# Patient Record
Sex: MALE | Race: WHITE | NOT HISPANIC OR LATINO | Employment: OTHER | URBAN - METROPOLITAN AREA
[De-identification: names, ages, dates, MRNs, and addresses within clinical notes are randomized per-mention and may not be internally consistent; named-entity substitution may affect disease eponyms.]

---

## 2020-05-22 ENCOUNTER — LAB VISIT (OUTPATIENT)
Dept: PRIMARY CARE CLINIC | Facility: CLINIC | Age: 65
End: 2020-05-22
Payer: MEDICARE

## 2020-05-22 DIAGNOSIS — R05.9 COUGH: Primary | ICD-10-CM

## 2020-05-22 PROCEDURE — U0003 INFECTIOUS AGENT DETECTION BY NUCLEIC ACID (DNA OR RNA); SEVERE ACUTE RESPIRATORY SYNDROME CORONAVIRUS 2 (SARS-COV-2) (CORONAVIRUS DISEASE [COVID-19]), AMPLIFIED PROBE TECHNIQUE, MAKING USE OF HIGH THROUGHPUT TECHNOLOGIES AS DESCRIBED BY CMS-2020-01-R: HCPCS

## 2020-05-23 LAB — SARS-COV-2 RNA RESP QL NAA+PROBE: NOT DETECTED

## 2020-08-03 ENCOUNTER — OFFICE VISIT (OUTPATIENT)
Dept: FAMILY MEDICINE | Facility: CLINIC | Age: 65
End: 2020-08-03
Payer: MEDICARE

## 2020-08-03 VITALS
WEIGHT: 158 LBS | HEART RATE: 64 BPM | HEIGHT: 68 IN | SYSTOLIC BLOOD PRESSURE: 136 MMHG | BODY MASS INDEX: 23.95 KG/M2 | TEMPERATURE: 99 F | DIASTOLIC BLOOD PRESSURE: 72 MMHG

## 2020-08-03 DIAGNOSIS — Z12.11 COLON CANCER SCREENING: ICD-10-CM

## 2020-08-03 DIAGNOSIS — Z12.5 PROSTATE CANCER SCREENING: ICD-10-CM

## 2020-08-03 DIAGNOSIS — I10 ESSENTIAL HYPERTENSION: Primary | ICD-10-CM

## 2020-08-03 PROCEDURE — 99203 OFFICE O/P NEW LOW 30 MIN: CPT | Mod: S$GLB,,, | Performed by: NURSE PRACTITIONER

## 2020-08-03 PROCEDURE — 99203 PR OFFICE/OUTPT VISIT, NEW, LEVL III, 30-44 MIN: ICD-10-PCS | Mod: S$GLB,,, | Performed by: NURSE PRACTITIONER

## 2020-08-03 RX ORDER — LOSARTAN POTASSIUM 100 MG/1
1 TABLET ORAL DAILY
COMMUNITY
Start: 2014-06-01 | End: 2020-08-03 | Stop reason: SDUPTHER

## 2020-08-03 RX ORDER — LOSARTAN POTASSIUM 100 MG/1
100 TABLET ORAL DAILY
Qty: 90 TABLET | Refills: 3 | Status: SHIPPED | OUTPATIENT
Start: 2020-08-03 | End: 2021-08-04 | Stop reason: SDUPTHER

## 2020-08-03 RX ORDER — MULTIVITAMIN/IRON/FOLIC ACID 18MG-0.4MG
TABLET ORAL
COMMUNITY
Start: 2010-08-01

## 2020-08-03 RX ORDER — GLUCOSAMINE/D3/BOSWELLIA SERRA 1500MG-400
TABLET ORAL
COMMUNITY

## 2020-08-03 RX ORDER — TURMERIC 400 MG
CAPSULE ORAL
COMMUNITY
Start: 2020-07-01

## 2020-08-03 NOTE — PROGRESS NOTES
SUBJECTIVE:    Patient ID: Myron Iqbal is a 65 y.o. male.    Chief Complaint: Establish Care (brought bottles// SW)      Pt here for new pt appt. Former pt of Maxime Orr  Pt reports hx of HTN for past 5-6 years, controlled on losartan. Reports past year or two has had a couple episodes of vertigo- seen in UC and treated with meclizine which resolves symptoms. No recent symptoms  Stays active, walks for exercise 3 days/week. Reports has a house in [place] with his partner- his partner is currently trying to become a  and needs form completed. Mother is in a NH locally so states he won't move until she passes away.  Reports last cscope 5 years ago with Dr. Zhong [facility]- told to repeat in 10 years however he reports his father  age 64 with colon CA       Lab Visit on 2020   Component Date Value Ref Range Status    SARS-CoV2 (COVID-19) Qualitative P* 2020 Not Detected  Not Detected Final       Past Medical History:   Diagnosis Date    Hypertension      Past Surgical History:   Procedure Laterality Date    FRACTURE SURGERY  3/2013    [facility]  syndrone.  Ulna reduction surgery    HERNIA REPAIR      neck fusion      C5-C6    SPINE SURGERY  2012    Fusion of c5  & c6    ulnar reduction Right      Family History   Problem Relation Age of Onset    Cancer Father         Colon cancer    Hypertension Father     Cancer Mother         Colon cancer    Diabetes Mother     Hypertension Mother     Cancer Brother         Stage 4 Melanoma    Diabetes Brother          of heart attach because of diabetes    Heart disease Brother        Marital Status: Single  Alcohol History:  reports current alcohol use of about 2.0 standard drinks of alcohol per week.  Tobacco History:  reports that he has never smoked. He has never used smokeless tobacco.  Drug History:  reports no history of drug use.    Review of patient's allergies indicates:  No Known Allergies    Current Outpatient  "Medications:     glucosamine-D3-Boswellia serr 1,500-400-100 mg-unit-mg Tab, Take by mouth., Disp: , Rfl:     losartan (COZAAR) 100 MG tablet, Take 1 tablet (100 mg total) by mouth once daily., Disp: 90 tablet, Rfl: 3    multivitamin-iron-folic acid (CENTRUM) Tab, , Disp: , Rfl:     turmeric 400 mg Cap, , Disp: , Rfl:     Review of Systems   Constitutional: Negative for appetite change, chills and fever.   Respiratory: Negative for cough, shortness of breath and wheezing.    Cardiovascular: Negative for chest pain, palpitations and leg swelling.   Gastrointestinal: Negative for abdominal pain, constipation and diarrhea.   Genitourinary: Negative for dysuria, frequency and hematuria.   Musculoskeletal: Negative for back pain and gait problem.   Skin: Negative for rash.   Neurological: Negative for dizziness, syncope and numbness.   Psychiatric/Behavioral: Negative for dysphoric mood. The patient is not nervous/anxious.           Objective:      Vitals:    08/03/20 1006   BP: 136/72   Pulse: 64   Temp: 98.5 °F (36.9 °C)   Weight: 71.7 kg (158 lb)   Height: 5' 7.5" (1.715 m)     Physical Exam  Vitals signs and nursing note reviewed.   Constitutional:       Appearance: He is well-developed.   HENT:      Head: Normocephalic and atraumatic.      Right Ear: Tympanic membrane and ear canal normal.      Left Ear: Tympanic membrane and ear canal normal.      Mouth/Throat:      Pharynx: No posterior oropharyngeal erythema.   Neck:      Musculoskeletal: Neck supple.      Vascular: No carotid bruit.   Cardiovascular:      Rate and Rhythm: Normal rate and regular rhythm.      Heart sounds: No murmur. No friction rub. No gallop.    Pulmonary:      Effort: Pulmonary effort is normal. No respiratory distress.      Breath sounds: Normal breath sounds. No wheezing or rales.   Abdominal:      General: There is no distension.      Palpations: Abdomen is soft.      Tenderness: There is no abdominal tenderness.   Lymphadenopathy:      " Cervical: No cervical adenopathy.   Skin:     General: Skin is warm and dry.      Findings: No rash.   Neurological:      Mental Status: He is alert and oriented to person, place, and time.      Gait: Gait normal.           Assessment:       1. Essential hypertension    2. Prostate cancer screening    3. Colon cancer screening           Plan:       Essential hypertension  Comments:  BP well controlled, baseline labs ordered to be drawn today  Orders:  -     CBC auto differential; Future; Expected date: 08/03/2020  -     Comprehensive metabolic panel; Future; Expected date: 08/03/2020  -     Lipid Panel; Future; Expected date: 08/03/2020  -     Urinalysis, Reflex to Urine Culture Urine, Clean Catch; Future; Expected date: 08/03/2020  -     TSH w/reflex to FT4; Future; Expected date: 08/03/2020  -     Microalbumin/creatinine urine ratio; Future; Expected date: 08/03/2020  -     losartan (COZAAR) 100 MG tablet; Take 1 tablet (100 mg total) by mouth once daily.  Dispense: 90 tablet; Refill: 3    Prostate cancer screening  -     PSA, Screening; Future; Expected date: 08/03/2020    Colon cancer screening  Comments:  Patient advised given father's history of colon cancer I would recommend repeat colonoscopy before 10 years, referral to Dr. Melara given  Orders:  -     Ambulatory referral/consult to Gastroenterology; Future; Expected date: 08/10/2020      Follow up in about 6 months (around 2/3/2021) for HTN, labs to be drawn today.        8/3/2020 Pamela Medina NP

## 2020-08-03 NOTE — LETTER
[address]  [place] [address]  Phone: [phone]   [phone]                        MD Adri Aguillon MD Chequita Williams, MD Matthew Bassett, PA-C Allison Hoffritz, DIVYA Haley, DIVYA      Date: 08/03/2020        Patient: Myron Iqbal  YOB: 1955      Please fax over pt most recent colonoscopy.      Sincerely,     Kathleen Pryor MA

## 2020-08-04 LAB
ALBUMIN SERPL-MCNC: 4.5 G/DL (ref 3.6–5.1)
ALBUMIN/CREAT UR: NORMAL MCG/MG CREAT
ALBUMIN/GLOB SERPL: 1.6 (CALC) (ref 1–2.5)
ALP SERPL-CCNC: 87 U/L (ref 35–144)
ALT SERPL-CCNC: 22 U/L (ref 9–46)
APPEARANCE UR: CLEAR
AST SERPL-CCNC: 17 U/L (ref 10–35)
BACTERIA #/AREA URNS HPF: NORMAL /HPF
BACTERIA UR CULT: NORMAL
BASOPHILS # BLD AUTO: 28 CELLS/UL (ref 0–200)
BASOPHILS NFR BLD AUTO: 0.5 %
BILIRUB SERPL-MCNC: 0.6 MG/DL (ref 0.2–1.2)
BILIRUB UR QL STRIP: NEGATIVE
BUN SERPL-MCNC: 26 MG/DL (ref 7–25)
BUN/CREAT SERPL: 30 (CALC) (ref 6–22)
CALCIUM SERPL-MCNC: 9.8 MG/DL (ref 8.6–10.3)
CHLORIDE SERPL-SCNC: 106 MMOL/L (ref 98–110)
CHOLEST SERPL-MCNC: 205 MG/DL
CHOLEST/HDLC SERPL: 4.1 (CALC)
CO2 SERPL-SCNC: 26 MMOL/L (ref 20–32)
COLOR UR: YELLOW
CREAT SERPL-MCNC: 0.87 MG/DL (ref 0.7–1.25)
CREAT UR-MCNC: 53 MG/DL (ref 20–320)
EOSINOPHIL # BLD AUTO: 353 CELLS/UL (ref 15–500)
EOSINOPHIL NFR BLD AUTO: 6.3 %
ERYTHROCYTE [DISTWIDTH] IN BLOOD BY AUTOMATED COUNT: 13.1 % (ref 11–15)
GFRSERPLBLD MDRD-ARVRAT: 90 ML/MIN/1.73M2
GLOBULIN SER CALC-MCNC: 2.8 G/DL (CALC) (ref 1.9–3.7)
GLUCOSE SERPL-MCNC: 91 MG/DL (ref 65–99)
GLUCOSE UR QL STRIP: NEGATIVE
HCT VFR BLD AUTO: 45.8 % (ref 38.5–50)
HDLC SERPL-MCNC: 50 MG/DL
HGB BLD-MCNC: 15.3 G/DL (ref 13.2–17.1)
HGB UR QL STRIP: NEGATIVE
HYALINE CASTS #/AREA URNS LPF: NORMAL /LPF
KETONES UR QL STRIP: NEGATIVE
LDLC SERPL CALC-MCNC: 122 MG/DL (CALC)
LEUKOCYTE ESTERASE UR QL STRIP: NEGATIVE
LYMPHOCYTES # BLD AUTO: 1781 CELLS/UL (ref 850–3900)
LYMPHOCYTES NFR BLD AUTO: 31.8 %
MCH RBC QN AUTO: 28.9 PG (ref 27–33)
MCHC RBC AUTO-ENTMCNC: 33.4 G/DL (ref 32–36)
MCV RBC AUTO: 86.6 FL (ref 80–100)
MICROALBUMIN UR-MCNC: <0.2 MG/DL
MONOCYTES # BLD AUTO: 426 CELLS/UL (ref 200–950)
MONOCYTES NFR BLD AUTO: 7.6 %
NEUTROPHILS # BLD AUTO: 3013 CELLS/UL (ref 1500–7800)
NEUTROPHILS NFR BLD AUTO: 53.8 %
NITRITE UR QL STRIP: NEGATIVE
NONHDLC SERPL-MCNC: 155 MG/DL (CALC)
PH UR STRIP: 6.5 [PH] (ref 5–8)
PLATELET # BLD AUTO: 229 THOUSAND/UL (ref 140–400)
PMV BLD REES-ECKER: 10.7 FL (ref 7.5–12.5)
POTASSIUM SERPL-SCNC: 4.3 MMOL/L (ref 3.5–5.3)
PROT SERPL-MCNC: 7.3 G/DL (ref 6.1–8.1)
PROT UR QL STRIP: NEGATIVE
PSA SERPL-MCNC: 1.4 NG/ML
RBC # BLD AUTO: 5.29 MILLION/UL (ref 4.2–5.8)
RBC #/AREA URNS HPF: NORMAL /HPF
SODIUM SERPL-SCNC: 140 MMOL/L (ref 135–146)
SP GR UR STRIP: 1.01 (ref 1–1.03)
SQUAMOUS #/AREA URNS HPF: NORMAL /HPF
TRIGL SERPL-MCNC: 209 MG/DL
TSH SERPL-ACNC: 1.38 MIU/L (ref 0.4–4.5)
WBC # BLD AUTO: 5.6 THOUSAND/UL (ref 3.8–10.8)
WBC #/AREA URNS HPF: NORMAL /HPF

## 2020-08-05 ENCOUNTER — TELEPHONE (OUTPATIENT)
Dept: FAMILY MEDICINE | Facility: CLINIC | Age: 65
End: 2020-08-05

## 2020-08-05 RX ORDER — ATORVASTATIN CALCIUM 20 MG/1
20 TABLET, FILM COATED ORAL DAILY
Qty: 90 TABLET | Refills: 0 | Status: SHIPPED | OUTPATIENT
Start: 2020-08-05 | End: 2020-11-18 | Stop reason: SDUPTHER

## 2020-08-05 NOTE — PROGRESS NOTES
Spoke to patient with results verbatim per Pamela. Verbalized understanding on all. He agrees to take Atorvastatin and work on low fast diet. Allergies and pharmacy reviewed. Order pended. Remind me for lab. Patient aware we will call when lab is due.

## 2020-08-05 NOTE — TELEPHONE ENCOUNTER
----- Message from Pamela Medina NP sent at 8/4/2020  9:37 PM CDT -----  Please call pt and let him know recent labs show his bad cholesterol (LDL) and triglycerides are elevated. Given hypertension LDL goal is <100 and his is 122. His 10 year CV risk is calculated at 17% which is considered high intermediate and I would recommend starting medication- atorvastatin 20mg daily in addition to low fat diet. The rest of his labs look good- blood sugar, kidney, liver, thyroid, prostate and blood count within normal range. Repeat CMP, lipids in 3mos if he agrees to start medication, otherwise recommend repeating in 6 months.

## 2020-08-05 NOTE — PROGRESS NOTES
Please call pt and let him know recent labs show his bad cholesterol (LDL) and triglycerides are elevated. Given hypertension LDL goal is <100 and his is 122. His 10 year CV risk is calculated at 17% which is considered high intermediate and I would recommend starting medication- atorvastatin 20mg daily in addition to low fat diet. The rest of his labs look good- blood sugar, kidney, liver, thyroid, prostate and blood count within normal range. Repeat CMP, lipids in 3mos if he agrees to start medication, otherwise recommend repeating in 6 months.

## 2020-10-27 ENCOUNTER — TELEPHONE (OUTPATIENT)
Dept: FAMILY MEDICINE | Facility: CLINIC | Age: 65
End: 2020-10-27

## 2020-10-27 DIAGNOSIS — E78.5 HYPERLIPIDEMIA: Primary | ICD-10-CM

## 2020-10-27 DIAGNOSIS — Z79.899 ENCOUNTER FOR LONG-TERM (CURRENT) USE OF OTHER MEDICATIONS: ICD-10-CM

## 2020-10-27 NOTE — TELEPHONE ENCOUNTER
----- Message from Hermelinda Connphal, RT sent at 8/5/2020 10:36 AM CDT -----  Regarding: Lab due  Pamela Medina NP sent at 8/4/2020  9:37 PM CDT -----  Please call pt and let him know recent labs show his bad cholesterol (LDL) and triglycerides are elevated. Given hypertension LDL goal is <100 and his is 122. His 10 year CV risk is calculated at 17% which is considered high intermediate and I would recommend starting medication- atorvastatin 20mg daily in addition to low fat diet. The rest of his labs look good- blood sugar, kidney, liver, thyroid, prostate and blood count within normal range. Repeat CMP, lipids in 3mos if he agrees to start medication, otherwise recommend repeating in 6 months.

## 2020-10-27 NOTE — TELEPHONE ENCOUNTER
Spoke to patient that fasting lab is due to check cholesterol since starting Atorvastatin. Said he is in [place] for 2 weeks and will come when he gets home. Orders pended. Updated remind me.

## 2020-11-14 LAB
ALBUMIN SERPL-MCNC: 4.2 G/DL (ref 3.6–5.1)
ALBUMIN/GLOB SERPL: 1.5 (CALC) (ref 1–2.5)
ALP SERPL-CCNC: 257 U/L (ref 35–144)
ALT SERPL-CCNC: 71 U/L (ref 9–46)
AST SERPL-CCNC: 34 U/L (ref 10–35)
BILIRUB SERPL-MCNC: 0.8 MG/DL (ref 0.2–1.2)
BUN SERPL-MCNC: 16 MG/DL (ref 7–25)
BUN/CREAT SERPL: ABNORMAL (CALC) (ref 6–22)
CALCIUM SERPL-MCNC: 9.5 MG/DL (ref 8.6–10.3)
CHLORIDE SERPL-SCNC: 103 MMOL/L (ref 98–110)
CHOLEST SERPL-MCNC: 145 MG/DL
CHOLEST/HDLC SERPL: 2.8 (CALC)
CO2 SERPL-SCNC: 30 MMOL/L (ref 20–32)
CREAT SERPL-MCNC: 0.86 MG/DL (ref 0.7–1.25)
GFRSERPLBLD MDRD-ARVRAT: 90 ML/MIN/1.73M2
GLOBULIN SER CALC-MCNC: 2.8 G/DL (CALC) (ref 1.9–3.7)
GLUCOSE SERPL-MCNC: 95 MG/DL (ref 65–99)
HDLC SERPL-MCNC: 52 MG/DL
LDLC SERPL CALC-MCNC: 72 MG/DL (CALC)
NONHDLC SERPL-MCNC: 93 MG/DL (CALC)
POTASSIUM SERPL-SCNC: 4.3 MMOL/L (ref 3.5–5.3)
PROT SERPL-MCNC: 7 G/DL (ref 6.1–8.1)
SODIUM SERPL-SCNC: 139 MMOL/L (ref 135–146)
TRIGL SERPL-MCNC: 129 MG/DL

## 2020-11-16 ENCOUNTER — TELEPHONE (OUTPATIENT)
Dept: FAMILY MEDICINE | Facility: CLINIC | Age: 65
End: 2020-11-16

## 2020-11-18 ENCOUNTER — TELEPHONE (OUTPATIENT)
Dept: FAMILY MEDICINE | Facility: CLINIC | Age: 65
End: 2020-11-18

## 2020-11-18 DIAGNOSIS — E78.5 HYPERLIPIDEMIA: ICD-10-CM

## 2020-11-18 DIAGNOSIS — Z79.899 ENCOUNTER FOR LONG-TERM (CURRENT) USE OF OTHER MEDICATIONS: Primary | ICD-10-CM

## 2020-11-18 RX ORDER — ATORVASTATIN CALCIUM 20 MG/1
20 TABLET, FILM COATED ORAL DAILY
Qty: 90 TABLET | Refills: 1 | Status: SHIPPED | OUTPATIENT
Start: 2020-11-18 | End: 2021-02-04 | Stop reason: SDUPTHER

## 2020-11-18 NOTE — TELEPHONE ENCOUNTER
Spoke to patient with results verbatim per Pamela. Verbalized understanding on all, including to avoid excessTylenol use and recheck lab in 6 months. Remind me created. Patient said he needs a refill on Lipitor. Pended this and lab orders. Pharmacy verified.

## 2020-11-18 NOTE — TELEPHONE ENCOUNTER
----- Message from Pamela Medina NP sent at 11/18/2020  8:20 AM CST -----  Please call patient and let him know cholesterol levels are much improved.  Bad cholesterol LDL went from 122 to72 and triglycerides are now within normal range.  One of 2 liver enzymes is mildly elevated but we will monitor- also recommend he avoid excess tylenol us.  Blood sugar and kidney function within normal range.  Repeat lipids and CMP in 6 months

## 2020-11-18 NOTE — TELEPHONE ENCOUNTER
Please call patient and let him know cholesterol levels are much improved.  Bad cholesterol LDL went from 122 to72 and triglycerides are now within normal range.  One of 2 liver enzymes is mildly elevated but we will monitor- also recommend he avoid excess tylenol us.  Blood sugar and kidney function within normal range.  Repeat lipids and CMP in 6 months

## 2021-02-04 ENCOUNTER — OFFICE VISIT (OUTPATIENT)
Dept: FAMILY MEDICINE | Facility: CLINIC | Age: 66
End: 2021-02-04
Payer: MEDICARE

## 2021-02-04 VITALS
DIASTOLIC BLOOD PRESSURE: 84 MMHG | HEIGHT: 68 IN | HEART RATE: 72 BPM | BODY MASS INDEX: 23.79 KG/M2 | SYSTOLIC BLOOD PRESSURE: 136 MMHG | WEIGHT: 157 LBS

## 2021-02-04 DIAGNOSIS — I10 ESSENTIAL HYPERTENSION: Primary | ICD-10-CM

## 2021-02-04 DIAGNOSIS — E78.2 MIXED HYPERLIPIDEMIA: ICD-10-CM

## 2021-02-04 PROCEDURE — 99213 OFFICE O/P EST LOW 20 MIN: CPT | Mod: S$GLB,,, | Performed by: NURSE PRACTITIONER

## 2021-02-04 PROCEDURE — 99213 PR OFFICE/OUTPT VISIT, EST, LEVL III, 20-29 MIN: ICD-10-PCS | Mod: S$GLB,,, | Performed by: NURSE PRACTITIONER

## 2021-02-04 RX ORDER — ATORVASTATIN CALCIUM 20 MG/1
20 TABLET, FILM COATED ORAL DAILY
Qty: 90 TABLET | Refills: 1 | Status: SHIPPED | OUTPATIENT
Start: 2021-02-04 | End: 2021-08-04 | Stop reason: SDUPTHER

## 2021-03-08 ENCOUNTER — TELEPHONE (OUTPATIENT)
Dept: FAMILY MEDICINE | Facility: CLINIC | Age: 66
End: 2021-03-08

## 2021-04-12 ENCOUNTER — HOSPITAL ENCOUNTER (OUTPATIENT)
Dept: RADIOLOGY | Facility: HOSPITAL | Age: 66
Discharge: HOME | End: 2021-04-12
Attending: NURSE PRACTITIONER
Payer: MEDICARE

## 2021-04-12 ENCOUNTER — OFFICE VISIT (OUTPATIENT)
Dept: FAMILY MEDICINE | Facility: CLINIC | Age: 66
End: 2021-04-12
Payer: MEDICARE

## 2021-04-12 VITALS
SYSTOLIC BLOOD PRESSURE: 128 MMHG | HEIGHT: 68 IN | HEART RATE: 70 BPM | BODY MASS INDEX: 24.55 KG/M2 | WEIGHT: 162 LBS | DIASTOLIC BLOOD PRESSURE: 78 MMHG

## 2021-04-12 DIAGNOSIS — N20.0 NEPHROLITHIASIS: ICD-10-CM

## 2021-04-12 DIAGNOSIS — N20.0 NEPHROLITHIASIS: Primary | ICD-10-CM

## 2021-04-12 PROCEDURE — 74018 RADEX ABDOMEN 1 VIEW: CPT | Mod: TC,PO

## 2021-04-12 PROCEDURE — 99213 PR OFFICE/OUTPT VISIT, EST, LEVL III, 20-29 MIN: ICD-10-PCS | Mod: S$GLB,,, | Performed by: NURSE PRACTITIONER

## 2021-04-12 PROCEDURE — 99213 OFFICE O/P EST LOW 20 MIN: CPT | Mod: S$GLB,,, | Performed by: NURSE PRACTITIONER

## 2021-04-12 RX ORDER — TAMSULOSIN HYDROCHLORIDE 0.4 MG/1
CAPSULE ORAL
COMMUNITY
Start: 2021-04-10 | End: 2021-08-04

## 2021-04-13 ENCOUNTER — TELEPHONE (OUTPATIENT)
Dept: FAMILY MEDICINE | Facility: CLINIC | Age: 66
End: 2021-04-13

## 2021-04-15 LAB
COMPN STONE: NORMAL
ORIGIN STONE: NORMAL
WT STONE: 0.02 G

## 2021-04-20 ENCOUNTER — PATIENT MESSAGE (OUTPATIENT)
Dept: FAMILY MEDICINE | Facility: CLINIC | Age: 66
End: 2021-04-20

## 2021-04-20 DIAGNOSIS — R10.30 LOWER ABDOMINAL PAIN: ICD-10-CM

## 2021-04-20 DIAGNOSIS — N20.0 NEPHROLITHIASIS: Primary | ICD-10-CM

## 2021-04-28 ENCOUNTER — TELEPHONE (OUTPATIENT)
Dept: UROLOGY | Facility: CLINIC | Age: 66
End: 2021-04-28

## 2021-04-28 ENCOUNTER — HOSPITAL ENCOUNTER (OUTPATIENT)
Dept: RADIOLOGY | Facility: HOSPITAL | Age: 66
Discharge: HOME | End: 2021-04-28
Attending: NURSE PRACTITIONER
Payer: MEDICARE

## 2021-04-28 ENCOUNTER — PATIENT MESSAGE (OUTPATIENT)
Dept: FAMILY MEDICINE | Facility: CLINIC | Age: 66
End: 2021-04-28

## 2021-04-28 ENCOUNTER — OFFICE VISIT (OUTPATIENT)
Dept: UROLOGY | Facility: CLINIC | Age: 66
End: 2021-04-28
Payer: MEDICARE

## 2021-04-28 VITALS
DIASTOLIC BLOOD PRESSURE: 79 MMHG | RESPIRATION RATE: 18 BRPM | BODY MASS INDEX: 24.56 KG/M2 | SYSTOLIC BLOOD PRESSURE: 151 MMHG | HEIGHT: 68 IN | WEIGHT: 162.06 LBS | HEART RATE: 57 BPM

## 2021-04-28 DIAGNOSIS — N20.0 NEPHROLITHIASIS: ICD-10-CM

## 2021-04-28 DIAGNOSIS — R10.9 ABDOMINAL PAIN, UNSPECIFIED ABDOMINAL LOCATION: ICD-10-CM

## 2021-04-28 DIAGNOSIS — R10.9 ABDOMINAL PAIN, UNSPECIFIED ABDOMINAL LOCATION: Primary | ICD-10-CM

## 2021-04-28 DIAGNOSIS — R10.30 LOWER ABDOMINAL PAIN: ICD-10-CM

## 2021-04-28 DIAGNOSIS — N41.9 PROSTATITIS, UNSPECIFIED PROSTATITIS TYPE: ICD-10-CM

## 2021-04-28 LAB
BILIRUB SERPL-MCNC: NORMAL MG/DL
BLOOD URINE, POC: NORMAL
CLARITY, POC UA: CLEAR
COLOR, POC UA: YELLOW
GLUCOSE UR QL STRIP: NORMAL
KETONES UR QL STRIP: NORMAL
LEUKOCYTE ESTERASE URINE, POC: NORMAL
NITRITE, POC UA: NORMAL
PH, POC UA: 7
PROTEIN, POC: NORMAL
SPECIFIC GRAVITY, POC UA: 1.01
UROBILINOGEN, POC UA: 0.2

## 2021-04-28 PROCEDURE — 99215 OFFICE O/P EST HI 40 MIN: CPT | Mod: PBBFAC,25,PN | Performed by: NURSE PRACTITIONER

## 2021-04-28 PROCEDURE — 99999 PR PBB SHADOW E&M-EST. PATIENT-LVL V: CPT | Mod: PBBFAC,,, | Performed by: NURSE PRACTITIONER

## 2021-04-28 PROCEDURE — 81002 URINALYSIS NONAUTO W/O SCOPE: CPT | Mod: PBBFAC,PN | Performed by: NURSE PRACTITIONER

## 2021-04-28 PROCEDURE — 74176 CT ABD & PELVIS W/O CONTRAST: CPT | Mod: TC

## 2021-04-28 PROCEDURE — 99999 PR PBB SHADOW E&M-EST. PATIENT-LVL V: ICD-10-PCS | Mod: PBBFAC,,, | Performed by: NURSE PRACTITIONER

## 2021-04-28 PROCEDURE — 74176 CT ABD & PELVIS W/O CONTRAST: CPT | Mod: 26,,, | Performed by: RADIOLOGY

## 2021-04-28 PROCEDURE — 99204 PR OFFICE/OUTPT VISIT, NEW, LEVL IV, 45-59 MIN: ICD-10-PCS | Mod: S$PBB,,, | Performed by: NURSE PRACTITIONER

## 2021-04-28 PROCEDURE — 99204 OFFICE O/P NEW MOD 45 MIN: CPT | Mod: S$PBB,,, | Performed by: NURSE PRACTITIONER

## 2021-04-28 PROCEDURE — 74176 CT RENAL STONE STUDY ABD PELVIS WO: ICD-10-PCS | Mod: 26,,, | Performed by: RADIOLOGY

## 2021-04-28 RX ORDER — DOXYCYCLINE HYCLATE 100 MG
100 TABLET ORAL 2 TIMES DAILY
Qty: 42 TABLET | Refills: 0 | Status: SHIPPED | OUTPATIENT
Start: 2021-04-28 | End: 2021-05-19

## 2021-05-11 ENCOUNTER — TELEPHONE (OUTPATIENT)
Dept: FAMILY MEDICINE | Facility: CLINIC | Age: 66
End: 2021-05-11

## 2021-05-13 LAB
ALBUMIN SERPL-MCNC: 4.2 G/DL (ref 3.6–5.1)
ALBUMIN/GLOB SERPL: 1.6 (CALC) (ref 1–2.5)
ALP SERPL-CCNC: 111 U/L (ref 35–144)
ALT SERPL-CCNC: 54 U/L (ref 9–46)
AST SERPL-CCNC: 36 U/L (ref 10–35)
BILIRUB SERPL-MCNC: 0.7 MG/DL (ref 0.2–1.2)
BUN SERPL-MCNC: 22 MG/DL (ref 7–25)
BUN/CREAT SERPL: ABNORMAL (CALC) (ref 6–22)
CALCIUM SERPL-MCNC: 9.3 MG/DL (ref 8.6–10.3)
CHLORIDE SERPL-SCNC: 104 MMOL/L (ref 98–110)
CHOLEST SERPL-MCNC: 120 MG/DL
CHOLEST/HDLC SERPL: 2.2 (CALC)
CO2 SERPL-SCNC: 30 MMOL/L (ref 20–32)
CREAT SERPL-MCNC: 0.83 MG/DL (ref 0.7–1.25)
GLOBULIN SER CALC-MCNC: 2.6 G/DL (CALC) (ref 1.9–3.7)
GLUCOSE SERPL-MCNC: 95 MG/DL (ref 65–99)
HDLC SERPL-MCNC: 54 MG/DL
LDLC SERPL CALC-MCNC: 50 MG/DL (CALC)
NONHDLC SERPL-MCNC: 66 MG/DL (CALC)
POTASSIUM SERPL-SCNC: 4.7 MMOL/L (ref 3.5–5.3)
PROT SERPL-MCNC: 6.8 G/DL (ref 6.1–8.1)
SODIUM SERPL-SCNC: 139 MMOL/L (ref 135–146)
TRIGL SERPL-MCNC: 84 MG/DL

## 2021-08-04 ENCOUNTER — OFFICE VISIT (OUTPATIENT)
Dept: FAMILY MEDICINE | Facility: CLINIC | Age: 66
End: 2021-08-04
Payer: MEDICARE

## 2021-08-04 VITALS
DIASTOLIC BLOOD PRESSURE: 70 MMHG | HEART RATE: 68 BPM | WEIGHT: 157 LBS | SYSTOLIC BLOOD PRESSURE: 130 MMHG | BODY MASS INDEX: 23.79 KG/M2 | HEIGHT: 68 IN

## 2021-08-04 DIAGNOSIS — I10 ESSENTIAL HYPERTENSION: Primary | ICD-10-CM

## 2021-08-04 DIAGNOSIS — Z11.59 NEED FOR HEPATITIS C SCREENING TEST: ICD-10-CM

## 2021-08-04 DIAGNOSIS — R91.1 PULMONARY NODULE: ICD-10-CM

## 2021-08-04 DIAGNOSIS — E78.2 MIXED HYPERLIPIDEMIA: ICD-10-CM

## 2021-08-04 DIAGNOSIS — Z12.5 PROSTATE CANCER SCREENING: ICD-10-CM

## 2021-08-04 PROCEDURE — 99214 OFFICE O/P EST MOD 30 MIN: CPT | Mod: S$GLB,,, | Performed by: NURSE PRACTITIONER

## 2021-08-04 PROCEDURE — 99214 PR OFFICE/OUTPT VISIT, EST, LEVL IV, 30-39 MIN: ICD-10-PCS | Mod: S$GLB,,, | Performed by: NURSE PRACTITIONER

## 2021-08-04 RX ORDER — LOSARTAN POTASSIUM 100 MG/1
100 TABLET ORAL DAILY
Qty: 90 TABLET | Refills: 1 | Status: SHIPPED | OUTPATIENT
Start: 2021-08-04 | End: 2022-02-10 | Stop reason: SDUPTHER

## 2021-08-04 RX ORDER — ATORVASTATIN CALCIUM 20 MG/1
20 TABLET, FILM COATED ORAL DAILY
Qty: 90 TABLET | Refills: 1 | Status: SHIPPED | OUTPATIENT
Start: 2021-08-04 | End: 2022-02-10 | Stop reason: SDUPTHER

## 2021-10-29 ENCOUNTER — PATIENT MESSAGE (OUTPATIENT)
Dept: FAMILY MEDICINE | Facility: CLINIC | Age: 66
End: 2021-10-29
Payer: MEDICARE

## 2021-10-29 DIAGNOSIS — R91.1 SOLITARY PULMONARY NODULE: ICD-10-CM

## 2021-11-04 ENCOUNTER — HOSPITAL ENCOUNTER (OUTPATIENT)
Dept: RADIOLOGY | Facility: HOSPITAL | Age: 66
Discharge: HOME | End: 2021-11-04
Attending: NURSE PRACTITIONER
Payer: MEDICARE

## 2021-11-04 DIAGNOSIS — R91.1 SOLITARY PULMONARY NODULE: ICD-10-CM

## 2021-11-04 PROCEDURE — 71250 CT THORAX DX C-: CPT | Mod: TC,PO

## 2021-11-08 ENCOUNTER — TELEPHONE (OUTPATIENT)
Dept: FAMILY MEDICINE | Facility: CLINIC | Age: 66
End: 2021-11-08
Payer: MEDICARE

## 2022-01-26 ENCOUNTER — TELEPHONE (OUTPATIENT)
Dept: FAMILY MEDICINE | Facility: CLINIC | Age: 67
End: 2022-01-26
Payer: MEDICARE

## 2022-02-03 LAB
ALBUMIN SERPL-MCNC: 4 G/DL (ref 3.6–5.1)
ALBUMIN/CREAT UR: 3 MCG/MG CREAT
ALBUMIN/GLOB SERPL: 1.7 (CALC) (ref 1–2.5)
ALP SERPL-CCNC: 79 U/L (ref 35–144)
ALT SERPL-CCNC: 37 U/L (ref 9–46)
APPEARANCE UR: CLEAR
AST SERPL-CCNC: 30 U/L (ref 10–35)
BACTERIA #/AREA URNS HPF: NORMAL /HPF
BACTERIA UR CULT: NORMAL
BASOPHILS # BLD AUTO: 19 CELLS/UL (ref 0–200)
BASOPHILS NFR BLD AUTO: 0.4 %
BILIRUB SERPL-MCNC: 0.8 MG/DL (ref 0.2–1.2)
BILIRUB UR QL STRIP: NEGATIVE
BUN SERPL-MCNC: 21 MG/DL (ref 7–25)
BUN/CREAT SERPL: NORMAL (CALC) (ref 6–22)
CALCIUM SERPL-MCNC: 9.2 MG/DL (ref 8.6–10.3)
CHLORIDE SERPL-SCNC: 104 MMOL/L (ref 98–110)
CHOLEST SERPL-MCNC: 107 MG/DL
CHOLEST/HDLC SERPL: 1.9 (CALC)
CO2 SERPL-SCNC: 28 MMOL/L (ref 20–32)
COLOR UR: YELLOW
CREAT SERPL-MCNC: 0.85 MG/DL (ref 0.7–1.25)
CREAT UR-MCNC: 105 MG/DL (ref 20–320)
EOSINOPHIL # BLD AUTO: 293 CELLS/UL (ref 15–500)
EOSINOPHIL NFR BLD AUTO: 6.1 %
ERYTHROCYTE [DISTWIDTH] IN BLOOD BY AUTOMATED COUNT: 12.8 % (ref 11–15)
GLOBULIN SER CALC-MCNC: 2.4 G/DL (CALC) (ref 1.9–3.7)
GLUCOSE SERPL-MCNC: 97 MG/DL (ref 65–99)
GLUCOSE UR QL STRIP: NEGATIVE
HCT VFR BLD AUTO: 45 % (ref 38.5–50)
HDLC SERPL-MCNC: 55 MG/DL
HGB BLD-MCNC: 14.6 G/DL (ref 13.2–17.1)
HGB UR QL STRIP: NEGATIVE
HYALINE CASTS #/AREA URNS LPF: NORMAL /LPF
KETONES UR QL STRIP: NEGATIVE
LDLC SERPL CALC-MCNC: 38 MG/DL (CALC)
LEUKOCYTE ESTERASE UR QL STRIP: NEGATIVE
LYMPHOCYTES # BLD AUTO: 1440 CELLS/UL (ref 850–3900)
LYMPHOCYTES NFR BLD AUTO: 30 %
MCH RBC QN AUTO: 29.1 PG (ref 27–33)
MCHC RBC AUTO-ENTMCNC: 32.4 G/DL (ref 32–36)
MCV RBC AUTO: 89.6 FL (ref 80–100)
MICROALBUMIN UR-MCNC: 0.3 MG/DL
MONOCYTES # BLD AUTO: 494 CELLS/UL (ref 200–950)
MONOCYTES NFR BLD AUTO: 10.3 %
NEUTROPHILS # BLD AUTO: 2554 CELLS/UL (ref 1500–7800)
NEUTROPHILS NFR BLD AUTO: 53.2 %
NITRITE UR QL STRIP: NEGATIVE
NONHDLC SERPL-MCNC: 52 MG/DL (CALC)
PH UR STRIP: 6.5 [PH] (ref 5–8)
PLATELET # BLD AUTO: 215 THOUSAND/UL (ref 140–400)
PMV BLD REES-ECKER: 10.5 FL (ref 7.5–12.5)
POTASSIUM SERPL-SCNC: 4.2 MMOL/L (ref 3.5–5.3)
PROT SERPL-MCNC: 6.4 G/DL (ref 6.1–8.1)
PROT UR QL STRIP: NEGATIVE
PSA SERPL-MCNC: 1.46 NG/ML
RBC # BLD AUTO: 5.02 MILLION/UL (ref 4.2–5.8)
RBC #/AREA URNS HPF: NORMAL /HPF
SODIUM SERPL-SCNC: 138 MMOL/L (ref 135–146)
SP GR UR STRIP: 1.02 (ref 1–1.03)
SQUAMOUS #/AREA URNS HPF: NORMAL /HPF
TRIGL SERPL-MCNC: 68 MG/DL
TSH SERPL-ACNC: 0.98 MIU/L (ref 0.4–4.5)
WBC # BLD AUTO: 4.8 THOUSAND/UL (ref 3.8–10.8)
WBC #/AREA URNS HPF: NORMAL /HPF

## 2022-02-05 ENCOUNTER — HOSPITAL ENCOUNTER (EMERGENCY)
Facility: HOSPITAL | Age: 67
Discharge: HOME | End: 2022-02-05
Attending: EMERGENCY MEDICINE
Payer: MEDICARE

## 2022-02-05 VITALS
RESPIRATION RATE: 14 BRPM | BODY MASS INDEX: 23.79 KG/M2 | DIASTOLIC BLOOD PRESSURE: 79 MMHG | OXYGEN SATURATION: 99 % | HEIGHT: 68 IN | SYSTOLIC BLOOD PRESSURE: 130 MMHG | HEART RATE: 70 BPM | TEMPERATURE: 99 F | WEIGHT: 157 LBS

## 2022-02-05 DIAGNOSIS — S61.012A LACERATION OF LEFT THUMB WITHOUT FOREIGN BODY WITHOUT DAMAGE TO NAIL, INITIAL ENCOUNTER: ICD-10-CM

## 2022-02-05 DIAGNOSIS — S61.412A LACERATION OF LEFT HAND WITHOUT FOREIGN BODY, INITIAL ENCOUNTER: Primary | ICD-10-CM

## 2022-02-05 PROCEDURE — 12042 INTMD RPR N-HF/GENIT2.6-7.5: CPT

## 2022-02-05 PROCEDURE — 63600175 PHARM REV CODE 636 W HCPCS: Performed by: NURSE PRACTITIONER

## 2022-02-05 PROCEDURE — 90471 IMMUNIZATION ADMIN: CPT | Performed by: NURSE PRACTITIONER

## 2022-02-05 PROCEDURE — 90715 TDAP VACCINE 7 YRS/> IM: CPT | Performed by: NURSE PRACTITIONER

## 2022-02-05 PROCEDURE — 99283 EMERGENCY DEPT VISIT LOW MDM: CPT | Mod: 25

## 2022-02-05 RX ORDER — CEPHALEXIN 500 MG/1
500 CAPSULE ORAL 4 TIMES DAILY
Qty: 20 CAPSULE | Refills: 0 | Status: SHIPPED | OUTPATIENT
Start: 2022-02-05 | End: 2022-02-12

## 2022-02-05 RX ORDER — LIDOCAINE HYDROCHLORIDE 10 MG/ML
10 INJECTION, SOLUTION EPIDURAL; INFILTRATION; INTRACAUDAL; PERINEURAL ONCE
Status: DISCONTINUED | OUTPATIENT
Start: 2022-02-05 | End: 2022-02-05 | Stop reason: HOSPADM

## 2022-02-05 RX ORDER — MUPIROCIN 20 MG/G
OINTMENT TOPICAL 2 TIMES DAILY
Qty: 30 G | Refills: 0 | Status: SHIPPED | OUTPATIENT
Start: 2022-02-05 | End: 2022-02-15

## 2022-02-05 RX ADMIN — CLOSTRIDIUM TETANI TOXOID ANTIGEN (FORMALDEHYDE INACTIVATED), CORYNEBACTERIUM DIPHTHERIAE TOXOID ANTIGEN (FORMALDEHYDE INACTIVATED), BORDETELLA PERTUSSIS TOXOID ANTIGEN (GLUTARALDEHYDE INACTIVATED), BORDETELLA PERTUSSIS FILAMENTOUS HEMAGGLUTININ ANTIGEN (FORMALDEHYDE INACTIVATED), BORDETELLA PERTUSSIS PERTACTIN ANTIGEN, AND BORDETELLA PERTUSSIS FIMBRIAE 2/3 ANTIGEN 0.5 ML: 5; 2; 2.5; 5; 3; 5 INJECTION, SUSPENSION INTRAMUSCULAR at 12:02

## 2022-02-05 NOTE — Clinical Note
"Mryon Santosian" Rasheed was seen and treated in our emergency department on 2/5/2022.  He may return to work on 02/08/2022.       If you have any questions or concerns, please don't hesitate to call.      Laina Ernst NP"

## 2022-02-05 NOTE — ED PROVIDER NOTES
Encounter Date: 2022       History     Chief Complaint   Patient presents with    Laceration     Laceration to L thumb when falling off ladder today, denies hitting head or LOC, denies any other complaints      66-year-old male presents to the ER today with complaints of sustaining a fall down 2 steps from his attic ladder catching himself with his left hand on the side of the metal ladder and lacerating his left palmar side thumb for/1st digit during this event today just prior to arrival.  He states he did not hit his head or lose consciousness.  Small abrasion and wound noted to his right hand and dorsal left hand as well.  He states he is unsure when his last tetanus injection was.  Bleeding controlled with a bandage application prior to arrival. He denies use of anticoagulants.         Review of patient's allergies indicates:  No Known Allergies  Past Medical History:   Diagnosis Date    Hypertension     Kidney stone      Past Surgical History:   Procedure Laterality Date    FRACTURE SURGERY  3/2013    [facility]  syndrone.  Ulna reduction surgery    HERNIA REPAIR      neck fusion      C5-C6    SPINE SURGERY  2012    Fusion of c5  & c6    ulnar reduction Right      Family History   Problem Relation Age of Onset    Cancer Father         Colon cancer    Hypertension Father     Cancer Mother         Colon cancer    Diabetes Mother     Hypertension Mother     Cancer Brother         Stage 4 Melanoma    Diabetes Brother          of heart attach because of diabetes    Heart disease Brother      Social History     Tobacco Use    Smoking status: Never Smoker    Smokeless tobacco: Never Used   Substance Use Topics    Alcohol use: Yes     Alcohol/week: 2.0 standard drinks     Types: 2 Glasses of wine per week     Comment: socially    Drug use: Never     Review of Systems   Constitutional: Negative for chills, diaphoresis, fatigue and fever.   HENT: Negative for congestion, postnasal drip,  rhinorrhea, sinus pressure, sinus pain, sneezing, sore throat and trouble swallowing.    Eyes: Negative for photophobia and visual disturbance.   Respiratory: Negative for cough, choking, chest tightness, shortness of breath, wheezing and stridor.    Cardiovascular: Negative for chest pain, palpitations and leg swelling.   Gastrointestinal: Negative for abdominal pain, blood in stool, constipation, diarrhea, nausea and vomiting.   Endocrine: Negative for polydipsia, polyphagia and polyuria.   Genitourinary: Negative for decreased urine volume, dysuria, flank pain, frequency, hematuria and urgency.   Musculoskeletal: Negative for arthralgias, back pain, gait problem, myalgias, neck pain and neck stiffness.   Skin: Positive for wound. Negative for color change, pallor and rash.   Allergic/Immunologic: Negative for immunocompromised state.   Neurological: Negative for dizziness, seizures, syncope, speech difficulty, weakness, light-headedness and headaches.   Hematological: Does not bruise/bleed easily.   Psychiatric/Behavioral: Negative for agitation and confusion.   All other systems reviewed and are negative.      Physical Exam     Initial Vitals [02/05/22 1219]   BP Pulse Resp Temp SpO2   132/82 75 20 98 °F (36.7 °C) 100 %      MAP       --         Physical Exam    Nursing note and vitals reviewed.  Constitutional: He appears well-developed and well-nourished. He is not diaphoretic. No distress.   HENT:   Head: Normocephalic and atraumatic.   Right Ear: External ear normal.   Left Ear: External ear normal.   Nose: Nose normal.   Mouth/Throat: Oropharynx is clear and moist. No oropharyngeal exudate.   Eyes: Conjunctivae are normal. Pupils are equal, round, and reactive to light.   Neck:   Normal range of motion.  Cardiovascular: Normal rate.   Pulmonary/Chest: Breath sounds normal.   Abdominal: Abdomen is soft. Bowel sounds are normal. There is no abdominal tenderness.   Musculoskeletal:         General: Tenderness  present. No edema. Normal range of motion.      Right elbow: Normal.      Left elbow: Normal.      Right forearm: Normal.      Left forearm: Normal.      Right wrist: Normal.      Left wrist: Normal.      Right hand: Normal.      Left hand: Laceration and tenderness present. No bony tenderness. Normal strength. Normal sensation. There is no disruption of two-point discrimination. Normal capillary refill.      Cervical back: Normal range of motion.      Comments: 3.5 cm full thickness jagged flap laceration across very base of palmar left hand thumb (1st digit), full thickness with oozing active bleeding. No foreign bodies within wound margins. Normal ROM and strength. No visible tendon injury.      Neurological: He is alert and oriented to person, place, and time. He has normal strength. GCS score is 15. GCS eye subscore is 4. GCS verbal subscore is 5. GCS motor subscore is 6.   Skin: Skin is warm and dry. Capillary refill takes less than 2 seconds. Laceration noted. No rash noted. No erythema.   Psychiatric: He has a normal mood and affect. Thought content normal.         ED Course   Lac Repair    Date/Time: 2/5/2022 2:14 PM  Performed by: Laina Ernst NP  Authorized by: Boby Graham MD     Consent:     Consent obtained:  Verbal    Consent given by:  Patient    Risks, benefits, and alternatives were discussed: yes      Risks discussed:  Poor wound healing, poor cosmetic result, need for additional repair, nerve damage, tendon damage, vascular damage, retained foreign body, pain and infection  Universal protocol:     Patient identity confirmed:  Verbally with patient and arm band  Anesthesia:     Anesthesia method:  Local infiltration    Local anesthetic:  Lidocaine 1% w/o epi  Laceration details:     Location:  Finger    Finger location:  L thumb    Length (cm):  3.5  Pre-procedure details:     Preparation:  Patient was prepped and draped in usual sterile fashion and imaging obtained to evaluate for  foreign bodies  Exploration:     Imaging obtained: x-ray      Imaging outcome: foreign body not noted      Wound exploration: entire depth of wound visualized      Wound extent: no foreign bodies/material noted, no tendon damage noted (500) and no underlying fracture noted      Contaminated: no    Treatment:     Area cleansed with:  Povidone-iodine and saline    Amount of cleaning:  Extensive    Irrigation solution:  Sterile saline    Irrigation method:  Syringe    Debridement:  None    Undermining:  None  Skin repair:     Repair method:  Sutures    Suture size:  4-0    Suture material:  Nylon    Suture technique:  Simple interrupted    Number of sutures:  7  Approximation:     Approximation:  Close  Repair type:     Repair type:  Intermediate  Post-procedure details:     Dressing:  Non-adherent dressing and splint for protection    Procedure completion:  Tolerated well, no immediate complications      Labs Reviewed - No data to display       Imaging Results          X-Ray Hand 3 view Left (Final result)  Result time 02/05/22 12:52:52    Final result by Lcuila Caceres MD (02/05/22 12:52:52)                 Narrative:    Three views of the left hand    Clinical history as laceration to left thumb    There are no fractures, dislocations or acute osseous abnormalities.    IMPRESSION: Negative left hand    Electronically signed by:  Lucila Caceres MD  2/5/2022 12:52 PM [facility] Workstation: [id number]                               Medications   LIDOcaine (PF) 10 mg/ml (1%) injection 100 mg (has no administration in time range)   Tdap vaccine injection 0.5 mL (0.5 mLs Intramuscular Given 2/5/22 0658)     Medical Decision Making:   Patient's tetanus was updated in the ER.  Laceration successfully repair with 7 sutures.  Wound care instructions discussed in detail.  We attempted to put in a she thumb spica volar Velcro splint but the wound and injury was not able to successfully get in there is safely.  So we wrapped  it with extensive amount of bandaging, and wound care instructions discussed in detail.  Will have patient follow-up with PCP for suture removal wound reassessment in 5 7 days.  Will place on prophylactic Keflex and Bactroban antibiotic.  ER return precautions discussed he understands when return back to ER.  Other small minor wounds on his bilateral dorsal hands cleansed and bandage applied as well.                      Clinical Impression:   Final diagnoses:  [S61.412A] Laceration of left hand without foreign body, initial encounter (Primary)  [S61.012A] Laceration of left thumb without foreign body without damage to nail, initial encounter          ED Disposition Condition    Discharge Stable        ED Prescriptions     Medication Sig Dispense Start Date End Date Auth. Provider    cephALEXin (KEFLEX) 500 MG capsule Take 1 capsule (500 mg total) by mouth 4 (four) times daily. for 7 days 20 capsule 2/5/2022 2/12/2022 Laina Ernst NP    mupirocin (BACTROBAN) 2 % ointment Apply topically 2 (two) times daily. for 10 days 30 g 2/5/2022 2/15/2022 Laina Ernst NP        Follow-up Information     Follow up With Specialties Details Why Contact Info Additional Information    Pamela Medina NP Family Medicine Schedule an appointment as soon as possible for a visit on 2/11/2022 for ER visit follow up and re-evaluation, For wound re-check, For suture removal [address]  [address]  [facility] [address]  [phone]       [facility] - Emergency Dept Emergency Medicine Go to  As needed, If symptoms worsen [address] [id number] [phone] 1st floor           Laina Ernst NP  02/05/22 7134

## 2022-02-06 ENCOUNTER — PATIENT MESSAGE (OUTPATIENT)
Dept: FAMILY MEDICINE | Facility: CLINIC | Age: 67
End: 2022-02-06
Payer: MEDICARE

## 2022-02-10 ENCOUNTER — OFFICE VISIT (OUTPATIENT)
Dept: FAMILY MEDICINE | Facility: CLINIC | Age: 67
End: 2022-02-10
Payer: MEDICARE

## 2022-02-10 VITALS
SYSTOLIC BLOOD PRESSURE: 118 MMHG | DIASTOLIC BLOOD PRESSURE: 78 MMHG | WEIGHT: 157 LBS | HEIGHT: 67 IN | HEART RATE: 78 BPM | BODY MASS INDEX: 24.64 KG/M2

## 2022-02-10 DIAGNOSIS — E78.2 MIXED HYPERLIPIDEMIA: ICD-10-CM

## 2022-02-10 DIAGNOSIS — R91.1 PULMONARY NODULE: ICD-10-CM

## 2022-02-10 DIAGNOSIS — S61.412D LACERATION OF LEFT HAND WITHOUT FOREIGN BODY, SUBSEQUENT ENCOUNTER: ICD-10-CM

## 2022-02-10 DIAGNOSIS — I10 ESSENTIAL HYPERTENSION: Primary | ICD-10-CM

## 2022-02-10 PROCEDURE — 99214 OFFICE O/P EST MOD 30 MIN: CPT | Mod: S$GLB,,, | Performed by: NURSE PRACTITIONER

## 2022-02-10 PROCEDURE — 99214 PR OFFICE/OUTPT VISIT, EST, LEVL IV, 30-39 MIN: ICD-10-PCS | Mod: S$GLB,,, | Performed by: NURSE PRACTITIONER

## 2022-02-10 RX ORDER — LOSARTAN POTASSIUM 100 MG/1
100 TABLET ORAL DAILY
Qty: 90 TABLET | Refills: 1 | Status: SHIPPED | OUTPATIENT
Start: 2022-02-10 | End: 2022-08-08 | Stop reason: SDUPTHER

## 2022-02-10 RX ORDER — ATORVASTATIN CALCIUM 20 MG/1
20 TABLET, FILM COATED ORAL DAILY
Qty: 90 TABLET | Refills: 1 | Status: SHIPPED | OUTPATIENT
Start: 2022-02-10 | End: 2022-08-08 | Stop reason: SDUPTHER

## 2022-02-10 NOTE — PROGRESS NOTES
SUBJECTIVE:    Patient ID: Myron Iqbal is a 66 y.o. male.    Chief Complaint: Follow-up (No bottles, reviewed from list, vaccine taken//dp)    Patient here for regular follow-up-hypertension/lipids.  Patient reports overall he has been doing well.    Last week he was working on a ladder when he fell off the ladder and on the way down struck his hand on ladder suffering laceration to left thumb.  He was seen in ER on 2/5 and had stitches placed.  No head trauma or loss of consciousness with fall. Patient states he has been cleaning site daily with soap and water and then wrapping, no bleeding or drainage.  He has also been wearing hand splint to minimize movement of the thumb.  Reports he has tried to improve his diet recently as his  recently had a heart attack and stents placed      No visits with results within 6 Month(s) from this visit.   Latest known visit with results is:   Office Visit on 08/04/2021   Component Date Value Ref Range Status    WBC 02/02/2022 4.8  3.8 - 10.8 Thousand/uL Final    RBC 02/02/2022 5.02  4.20 - 5.80 Million/uL Final    Hemoglobin 02/02/2022 14.6  13.2 - 17.1 g/dL Final    Hematocrit 02/02/2022 45.0  38.5 - 50.0 % Final    MCV 02/02/2022 89.6  80.0 - 100.0 fL Final    MCH 02/02/2022 29.1  27.0 - 33.0 pg Final    MCHC 02/02/2022 32.4  32.0 - 36.0 g/dL Final    RDW 02/02/2022 12.8  11.0 - 15.0 % Final    Platelets 02/02/2022 215  140 - 400 Thousand/uL Final    MPV 02/02/2022 10.5  7.5 - 12.5 fL Final    Neutrophils, Abs 02/02/2022 2,554  1,500 - 7,800 cells/uL Final    Lymph # 02/02/2022 1,440  850 - 3,900 cells/uL Final    Mono # 02/02/2022 494  200 - 950 cells/uL Final    Eos # 02/02/2022 293  15 - 500 cells/uL Final    Baso # 02/02/2022 19  0 - 200 cells/uL Final    Neutrophils Relative 02/02/2022 53.2  % Final    Lymph % 02/02/2022 30.0  % Final    Mono % 02/02/2022 10.3  % Final    Eosinophil % 02/02/2022 6.1  % Final    Basophil % 02/02/2022 0.4  %  Final    Glucose 02/02/2022 97  65 - 99 mg/dL Final    BUN 02/02/2022 21  7 - 25 mg/dL Final    Creatinine 02/02/2022 0.85  0.70 - 1.25 mg/dL Final    eGFR if non African American 02/02/2022 90  > OR = 60 mL/min/1.73m2 Final    eGFR if  02/02/2022 104  > OR = 60 mL/min/1.73m2 Final    BUN/Creatinine Ratio 02/02/2022 NOT APPLICABLE  6 - 22 (calc) Final    Sodium 02/02/2022 138  135 - 146 mmol/L Final    Potassium 02/02/2022 4.2  3.5 - 5.3 mmol/L Final    Chloride 02/02/2022 104  98 - 110 mmol/L Final    CO2 02/02/2022 28  20 - 32 mmol/L Final    Calcium 02/02/2022 9.2  8.6 - 10.3 mg/dL Final    Total Protein 02/02/2022 6.4  6.1 - 8.1 g/dL Final    Albumin 02/02/2022 4.0  3.6 - 5.1 g/dL Final    Globulin, Total 02/02/2022 2.4  1.9 - 3.7 g/dL (calc) Final    Albumin/Globulin Ratio 02/02/2022 1.7  1.0 - 2.5 (calc) Final    Total Bilirubin 02/02/2022 0.8  0.2 - 1.2 mg/dL Final    Alkaline Phosphatase 02/02/2022 79  35 - 144 U/L Final    AST 02/02/2022 30  10 - 35 U/L Final    ALT 02/02/2022 37  9 - 46 U/L Final    Cholesterol 02/02/2022 107  <200 mg/dL Final    HDL 02/02/2022 55  > OR = 40 mg/dL Final    Triglycerides 02/02/2022 68  <150 mg/dL Final    LDL Cholesterol 02/02/2022 38  mg/dL (calc) Final    HDL/Cholesterol Ratio 02/02/2022 1.9  <5.0 (calc) Final    Non HDL Chol. (LDL+VLDL) 02/02/2022 52  <130 mg/dL (calc) Final    Color, UA 02/02/2022 YELLOW  YELLOW Final    Appearance, UA 02/02/2022 CLEAR  CLEAR Final    Specific Gravity, UA 02/02/2022 1.018  1.001 - 1.035 Final    pH, UA 02/02/2022 6.5  5.0 - 8.0 Final    Glucose, UA 02/02/2022 NEGATIVE  NEGATIVE Final    Bilirubin, UA 02/02/2022 NEGATIVE  NEGATIVE Final    Ketones, UA 02/02/2022 NEGATIVE  NEGATIVE Final    Occult Blood UA 02/02/2022 NEGATIVE  NEGATIVE Final    Protein, UA 02/02/2022 NEGATIVE  NEGATIVE Final    Nitrite, UA 02/02/2022 NEGATIVE  NEGATIVE Final    Leukocytes, UA 02/02/2022 NEGATIVE   NEGATIVE Final    WBC Casts, UA 2022 NONE SEEN  < OR = 5 /HPF Final    RBC Casts, UA 2022 NONE SEEN  < OR = 2 /HPF Final    Squam Epithel, UA 2022 NONE SEEN  < OR = 5 /HPF Final    Bacteria, UA 2022 NONE SEEN  NONE SEEN /HPF Final    Hyaline Casts, UA 2022 NONE SEEN  NONE SEEN /LPF Final    Reflexive Urine Culture 2022    Final    TSH w/reflex to FT4 2022 0.98  0.40 - 4.50 mIU/L Final    PROSTATE SPECIFIC ANTIGEN, SCR - Q* 2022 1.46  < OR = 4.00 ng/mL Final    Creatinine, Urine 2022 105  20 - 320 mg/dL Final    Microalb, Ur 2022 0.3  See Note: mg/dL Final    Microalb/Creat Ratio 2022 3  <30 mcg/mg creat Final       Past Medical History:   Diagnosis Date    Hypertension     Kidney stone      Past Surgical History:   Procedure Laterality Date    FRACTURE SURGERY  3/2013    [facility]  syndrone.  Ulna reduction surgery    HERNIA REPAIR      neck fusion      C5-C6    SPINE SURGERY  2012    Fusion of c5  & c6    ulnar reduction Right      Family History   Problem Relation Age of Onset    Cancer Father         Colon cancer    Hypertension Father     Cancer Mother         Colon cancer    Diabetes Mother     Hypertension Mother     Cancer Brother         Stage 4 Melanoma    Diabetes Brother          of heart attach because of diabetes    Heart disease Brother        The 10-year CVD risk score (NICKOLAS'Agostino, et al., 2008) is: 10.5%    Values used to calculate the score:      Age: 66 years      Sex: Male      Diabetic: No      Tobacco smoker: No      Systolic Blood Pressure: 118 mmHg      Is BP treated: Yes      HDL Cholesterol: 55 mg/dL      Total Cholesterol: 107 mg/dL     Marital Status: Single  Alcohol History:  reports current alcohol use of about 2.0 standard drinks of alcohol per week.  Tobacco History:  reports that he has never smoked. He has never used smokeless tobacco.  Drug History:  reports no history of drug  use.    Health Maintenance Topics with due status: Not Due       Topic Last Completion Date    Colorectal Cancer Screening 08/27/2020    Lipid Panel 02/02/2022    TETANUS VACCINE 02/05/2022     Immunization History   Administered Date(s) Administered    COVID-19, MRNA, LN-S, PF (MODERNA FULL 0.5 ML DOSE) 03/05/2021, 04/11/2021    COVID-19, MRNA, LN-S, PF (Pfizer) (Purple Cap) 10/28/2021    Influenza 12/29/2014, 01/04/2016, 09/15/2020    Influenza (FLUAD) - Quadrivalent - Adjuvanted - PF *Preferred* (65+) 09/01/2020    Influenza (FLUAD) - Trivalent - Adjuvanted - PF (65+) 09/23/2019    Influenza - Quadrivalent - PF *Preferred* (6 months and older) 10/02/2017, 12/09/2018    Pneumococcal Conjugate - 13 Valent 08/15/2019    Pneumococcal Polysaccharide - 23 Valent 09/01/2020    Td (ADULT) 05/03/2018    Tdap 02/05/2022    Zoster 04/20/2017       Review of patient's allergies indicates:  No Known Allergies    Current Outpatient Medications:     cephALEXin (KEFLEX) 500 MG capsule, Take 1 capsule (500 mg total) by mouth 4 (four) times daily. for 7 days, Disp: 20 capsule, Rfl: 0    glucosamine-D3-Boswellia serr 1,500-400-100 mg-unit-mg Tab, Take by mouth., Disp: , Rfl:     multivitamin-iron-folic acid (CENTRUM) Tab, , Disp: , Rfl:     mupirocin (BACTROBAN) 2 % ointment, Apply topically 2 (two) times daily. for 10 days, Disp: 30 g, Rfl: 0    turmeric 400 mg Cap, , Disp: , Rfl:     atorvastatin (LIPITOR) 20 MG tablet, Take 1 tablet (20 mg total) by mouth once daily. For cholesterol, Disp: 90 tablet, Rfl: 1    losartan (COZAAR) 100 MG tablet, Take 1 tablet (100 mg total) by mouth once daily., Disp: 90 tablet, Rfl: 1    Review of Systems   Constitutional: Negative for appetite change, chills and fever.   HENT: Negative for sore throat and trouble swallowing.    Eyes: Negative for visual disturbance.   Respiratory: Negative for cough, shortness of breath and wheezing.    Cardiovascular: Negative for chest pain,  "palpitations and leg swelling.   Gastrointestinal: Negative for abdominal pain, constipation and diarrhea.   Genitourinary: Negative for dysuria, frequency and hematuria.   Musculoskeletal: Negative for back pain and gait problem.   Skin: Negative for rash.   Neurological: Negative for dizziness, syncope and numbness.   Psychiatric/Behavioral: Negative for dysphoric mood. The patient is not nervous/anxious.           Objective:      Vitals:    02/10/22 0851   BP: 118/78   Pulse: 78   Weight: 71.2 kg (157 lb)   Height: 5' 7" (1.702 m)     Physical Exam  Vitals and nursing note reviewed.   Constitutional:       General: He is not in acute distress.     Appearance: Normal appearance. He is well-developed and normal weight.   HENT:      Head: Normocephalic and atraumatic.      Right Ear: Tympanic membrane and ear canal normal.      Left Ear: Tympanic membrane and ear canal normal.   Neck:      Vascular: No carotid bruit.   Cardiovascular:      Rate and Rhythm: Normal rate and regular rhythm.      Heart sounds: No murmur heard.  No friction rub. No gallop.    Pulmonary:      Effort: Pulmonary effort is normal. No respiratory distress.      Breath sounds: Normal breath sounds. No wheezing or rales.   Abdominal:      General: There is no distension.      Palpations: Abdomen is soft.      Tenderness: There is no abdominal tenderness.   Musculoskeletal:        Hands:       Cervical back: Neck supple.      Right lower leg: No edema.      Left lower leg: No edema.   Lymphadenopathy:      Cervical: No cervical adenopathy.   Skin:     General: Skin is warm and dry.      Findings: No rash.   Neurological:      General: No focal deficit present.      Mental Status: He is alert and oriented to person, place, and time.      Gait: Gait normal.   Psychiatric:         Mood and Affect: Mood normal.           Assessment:       1. Essential hypertension    2. Mixed hyperlipidemia    3. Pulmonary nodule    4. Laceration of left hand " without foreign body, subsequent encounter           Plan:       Essential hypertension  Comments:  BP well controlled  Orders:  -     losartan (COZAAR) 100 MG tablet; Take 1 tablet (100 mg total) by mouth once daily.  Dispense: 90 tablet; Refill: 1    Mixed hyperlipidemia  Comments:  Reviewed recent labs with patient, lipids well controlled/improved  Orders:  -     atorvastatin (LIPITOR) 20 MG tablet; Take 1 tablet (20 mg total) by mouth once daily. For cholesterol  Dispense: 90 tablet; Refill: 1    Pulmonary nodule  Comments:  Stable on last CT scan    Laceration of left hand without foreign body, subsequent encounter  Comments:  Patient by sutures are not ready to come out yet, reschedule appointment for next week      Follow up in about 4 days (around 2/14/2022) for suture removal.          Counseled on age and gender appropriate medical preventative services, including cancer screenings, immunizations, overall nutritional health, need for a consistent exercise regimen and an overall push towards maintaining a vigorous and active lifestyle.      2/10/2022 Pamela Medina NP

## 2022-02-14 ENCOUNTER — OFFICE VISIT (OUTPATIENT)
Dept: FAMILY MEDICINE | Facility: CLINIC | Age: 67
End: 2022-02-14
Payer: MEDICARE

## 2022-02-14 VITALS
HEART RATE: 80 BPM | WEIGHT: 153.81 LBS | HEIGHT: 67 IN | BODY MASS INDEX: 24.14 KG/M2 | DIASTOLIC BLOOD PRESSURE: 78 MMHG | SYSTOLIC BLOOD PRESSURE: 124 MMHG

## 2022-02-14 DIAGNOSIS — S61.412D LACERATION OF LEFT HAND WITHOUT FOREIGN BODY, SUBSEQUENT ENCOUNTER: Primary | ICD-10-CM

## 2022-02-14 PROCEDURE — 99213 PR OFFICE/OUTPT VISIT, EST, LEVL III, 20-29 MIN: ICD-10-PCS | Mod: S$GLB,,, | Performed by: NURSE PRACTITIONER

## 2022-02-14 PROCEDURE — 99213 OFFICE O/P EST LOW 20 MIN: CPT | Mod: S$GLB,,, | Performed by: NURSE PRACTITIONER

## 2022-02-14 NOTE — PROGRESS NOTES
" Patient ID: Myron Iqbal is a 66 y.o. male.    Chief Complaint: Suture / Staple Removal (No bottles, reviewed from list//dp)    Patient here for suture removal left hand- fell off ladder on 1/5 suffering lac to base of thumb- had sutures placed in ER that same day. Pt reports site is doing well, pain has improved. Cleaning daily with soap/water, no drainge               Past Medical History:   Diagnosis Date    Hypertension     Kidney stone      Past Surgical History:   Procedure Laterality Date    FRACTURE SURGERY  3/2013    [facility]  syndrone.  Ulna reduction surgery    HERNIA REPAIR      neck fusion      C5-C6    SPINE SURGERY  11/2012    Fusion of c5  & c6    ulnar reduction Right          Tobacco History:  reports that he has never smoked. He has never used smokeless tobacco.      Review of patient's allergies indicates:  No Known Allergies    Current Outpatient Medications:     atorvastatin (LIPITOR) 20 MG tablet, Take 1 tablet (20 mg total) by mouth once daily. For cholesterol, Disp: 90 tablet, Rfl: 1    glucosamine-D3-Boswellia serr 1,500-400-100 mg-unit-mg Tab, Take by mouth., Disp: , Rfl:     losartan (COZAAR) 100 MG tablet, Take 1 tablet (100 mg total) by mouth once daily., Disp: 90 tablet, Rfl: 1    multivitamin-iron-folic acid (CENTRUM) Tab, , Disp: , Rfl:     mupirocin (BACTROBAN) 2 % ointment, Apply topically 2 (two) times daily. for 10 days, Disp: 30 g, Rfl: 0    turmeric 400 mg Cap, , Disp: , Rfl:     Review of Systems   Constitutional: Negative for chills and fever.   HENT: Negative for trouble swallowing.    Respiratory: Negative for cough.    Cardiovascular: Negative for chest pain.   Gastrointestinal: Negative for abdominal pain.   Skin: Positive for wound.   Neurological: Negative for syncope and numbness.          Objective:      Vitals:    02/14/22 1044   BP: 124/78   Pulse: 80   Weight: 69.8 kg (153 lb 12.8 oz)   Height: 5' 7" (1.702 m)     Physical Exam  Vitals and nursing note " reviewed.   Constitutional:       General: He is not in acute distress.     Appearance: Normal appearance. He is well-developed and normal weight.   Cardiovascular:      Rate and Rhythm: Normal rate and regular rhythm.      Heart sounds: No murmur heard.  No gallop.    Pulmonary:      Effort: Pulmonary effort is normal. No respiratory distress.      Breath sounds: Normal breath sounds.   Musculoskeletal:        Hands:    Skin:     General: Skin is warm and dry.      Findings: No rash.   Neurological:      General: No focal deficit present.      Mental Status: He is alert and oriented to person, place, and time.      Gait: Gait normal.           Assessment:       1. Laceration of left hand without foreign body, subsequent encounter           Plan:       Laceration of left hand without foreign body, subsequent encounter  Comments:  mid aspect of incision still not well approximated thereore 3 sutures left in place mid laceration and will f/u 1 week for removal      Follow up in about 1 week (around 2/21/2022) for suture removal.        2/14/2022 Pamela Medina NP

## 2022-02-21 ENCOUNTER — OFFICE VISIT (OUTPATIENT)
Dept: FAMILY MEDICINE | Facility: CLINIC | Age: 67
End: 2022-02-21
Payer: MEDICARE

## 2022-02-21 VITALS
BODY MASS INDEX: 24.33 KG/M2 | HEART RATE: 60 BPM | DIASTOLIC BLOOD PRESSURE: 70 MMHG | SYSTOLIC BLOOD PRESSURE: 130 MMHG | HEIGHT: 67 IN | WEIGHT: 155 LBS

## 2022-02-21 DIAGNOSIS — S61.412D LACERATION OF LEFT HAND WITHOUT FOREIGN BODY, SUBSEQUENT ENCOUNTER: Primary | ICD-10-CM

## 2022-02-21 PROCEDURE — 99213 PR OFFICE/OUTPT VISIT, EST, LEVL III, 20-29 MIN: ICD-10-PCS | Mod: S$GLB,,, | Performed by: NURSE PRACTITIONER

## 2022-02-21 PROCEDURE — 99213 OFFICE O/P EST LOW 20 MIN: CPT | Mod: S$GLB,,, | Performed by: NURSE PRACTITIONER

## 2022-02-21 NOTE — PROGRESS NOTES
SUBJECTIVE:    Patient ID: Myron Iqbal is a 66 y.o. male.    Chief Complaint: Suture / Staple Removal (Went over meds verbally// SW)    Patient here for remaining suture removal left hand- suffered laceration on 1/5  to base of thumb- at last visit a few sutures remained in place to allow for more healing.  Patient has been cleaning site with soap and water daily and applying antibiotic ointment      No visits with results within 6 Month(s) from this visit.   Latest known visit with results is:   Office Visit on 08/04/2021   Component Date Value Ref Range Status    WBC 02/02/2022 4.8  3.8 - 10.8 Thousand/uL Final    RBC 02/02/2022 5.02  4.20 - 5.80 Million/uL Final    Hemoglobin 02/02/2022 14.6  13.2 - 17.1 g/dL Final    Hematocrit 02/02/2022 45.0  38.5 - 50.0 % Final    MCV 02/02/2022 89.6  80.0 - 100.0 fL Final    MCH 02/02/2022 29.1  27.0 - 33.0 pg Final    MCHC 02/02/2022 32.4  32.0 - 36.0 g/dL Final    RDW 02/02/2022 12.8  11.0 - 15.0 % Final    Platelets 02/02/2022 215  140 - 400 Thousand/uL Final    MPV 02/02/2022 10.5  7.5 - 12.5 fL Final    Neutrophils, Abs 02/02/2022 2,554  1,500 - 7,800 cells/uL Final    Lymph # 02/02/2022 1,440  850 - 3,900 cells/uL Final    Mono # 02/02/2022 494  200 - 950 cells/uL Final    Eos # 02/02/2022 293  15 - 500 cells/uL Final    Baso # 02/02/2022 19  0 - 200 cells/uL Final    Neutrophils Relative 02/02/2022 53.2  % Final    Lymph % 02/02/2022 30.0  % Final    Mono % 02/02/2022 10.3  % Final    Eosinophil % 02/02/2022 6.1  % Final    Basophil % 02/02/2022 0.4  % Final    Glucose 02/02/2022 97  65 - 99 mg/dL Final    BUN 02/02/2022 21  7 - 25 mg/dL Final    Creatinine 02/02/2022 0.85  0.70 - 1.25 mg/dL Final    eGFR if non African American 02/02/2022 90  > OR = 60 mL/min/1.73m2 Final    eGFR if  02/02/2022 104  > OR = 60 mL/min/1.73m2 Final    BUN/Creatinine Ratio 02/02/2022 NOT APPLICABLE  6 - 22 (calc) Final    Sodium 02/02/2022  138  135 - 146 mmol/L Final    Potassium 02/02/2022 4.2  3.5 - 5.3 mmol/L Final    Chloride 02/02/2022 104  98 - 110 mmol/L Final    CO2 02/02/2022 28  20 - 32 mmol/L Final    Calcium 02/02/2022 9.2  8.6 - 10.3 mg/dL Final    Total Protein 02/02/2022 6.4  6.1 - 8.1 g/dL Final    Albumin 02/02/2022 4.0  3.6 - 5.1 g/dL Final    Globulin, Total 02/02/2022 2.4  1.9 - 3.7 g/dL (calc) Final    Albumin/Globulin Ratio 02/02/2022 1.7  1.0 - 2.5 (calc) Final    Total Bilirubin 02/02/2022 0.8  0.2 - 1.2 mg/dL Final    Alkaline Phosphatase 02/02/2022 79  35 - 144 U/L Final    AST 02/02/2022 30  10 - 35 U/L Final    ALT 02/02/2022 37  9 - 46 U/L Final    Cholesterol 02/02/2022 107  <200 mg/dL Final    HDL 02/02/2022 55  > OR = 40 mg/dL Final    Triglycerides 02/02/2022 68  <150 mg/dL Final    LDL Cholesterol 02/02/2022 38  mg/dL (calc) Final    HDL/Cholesterol Ratio 02/02/2022 1.9  <5.0 (calc) Final    Non HDL Chol. (LDL+VLDL) 02/02/2022 52  <130 mg/dL (calc) Final    Color, UA 02/02/2022 YELLOW  YELLOW Final    Appearance, UA 02/02/2022 CLEAR  CLEAR Final    Specific Gravity, UA 02/02/2022 1.018  1.001 - 1.035 Final    pH, UA 02/02/2022 6.5  5.0 - 8.0 Final    Glucose, UA 02/02/2022 NEGATIVE  NEGATIVE Final    Bilirubin, UA 02/02/2022 NEGATIVE  NEGATIVE Final    Ketones, UA 02/02/2022 NEGATIVE  NEGATIVE Final    Occult Blood UA 02/02/2022 NEGATIVE  NEGATIVE Final    Protein, UA 02/02/2022 NEGATIVE  NEGATIVE Final    Nitrite, UA 02/02/2022 NEGATIVE  NEGATIVE Final    Leukocytes, UA 02/02/2022 NEGATIVE  NEGATIVE Final    WBC Casts, UA 02/02/2022 NONE SEEN  < OR = 5 /HPF Final    RBC Casts, UA 02/02/2022 NONE SEEN  < OR = 2 /HPF Final    Squam Epithel, UA 02/02/2022 NONE SEEN  < OR = 5 /HPF Final    Bacteria, UA 02/02/2022 NONE SEEN  NONE SEEN /HPF Final    Hyaline Casts, UA 02/02/2022 NONE SEEN  NONE SEEN /LPF Final    Reflexive Urine Culture 02/02/2022    Final    TSH w/reflex to FT4  2022 0.98  0.40 - 4.50 mIU/L Final    PROSTATE SPECIFIC ANTIGEN, SCR - Q* 2022 1.46  < OR = 4.00 ng/mL Final    Creatinine, Urine 2022 105  20 - 320 mg/dL Final    Microalb, Ur 2022 0.3  See Note: mg/dL Final    Microalb/Creat Ratio 2022 3  <30 mcg/mg creat Final       Past Medical History:   Diagnosis Date    Hypertension     Kidney stone      Past Surgical History:   Procedure Laterality Date    FRACTURE SURGERY  3/2013    [facility]  syndrone.  Ulna reduction surgery    HERNIA REPAIR      neck fusion      C5-C6    SPINE SURGERY  2012    Fusion of c5  & c6    ulnar reduction Right      Family History   Problem Relation Age of Onset    Cancer Father         Colon cancer    Hypertension Father     Cancer Mother         Colon cancer    Diabetes Mother     Hypertension Mother     Cancer Brother         Stage 4 Melanoma    Diabetes Brother          of heart attach because of diabetes    Heart disease Brother        The 10-year CVD risk score (Mercedes, et al., 2008) is: 12.5%    Values used to calculate the score:      Age: 66 years      Sex: Male      Diabetic: No      Tobacco smoker: No      Systolic Blood Pressure: 130 mmHg      Is BP treated: Yes      HDL Cholesterol: 55 mg/dL      Total Cholesterol: 107 mg/dL     Marital Status: Single  Alcohol History:  reports current alcohol use of about 2.0 standard drinks of alcohol per week.  Tobacco History:  reports that he has never smoked. He has never used smokeless tobacco.  Drug History:  reports no history of drug use.    Health Maintenance Topics with due status: Not Due       Topic Last Completion Date    Colorectal Cancer Screening 2020    Lipid Panel 2022    TETANUS VACCINE 2022     Immunization History   Administered Date(s) Administered    COVID-19, MRNA, LN-S, PF (MODERNA FULL 0.5 ML DOSE) 2021, 2021    COVID-19, MRNA, LN-S, PF (Pfizer) (Purple Cap) 10/28/2021    Influenza  "12/29/2014, 01/04/2016, 09/15/2020    Influenza (FLUAD) - Quadrivalent - Adjuvanted - PF *Preferred* (65+) 09/01/2020    Influenza (FLUAD) - Trivalent - Adjuvanted - PF (65+) 09/23/2019    Influenza - Quadrivalent - PF *Preferred* (6 months and older) 10/02/2017, 12/09/2018    Pneumococcal Conjugate - 13 Valent 08/15/2019    Pneumococcal Polysaccharide - 23 Valent 09/01/2020    Td (ADULT) 05/03/2018    Tdap 02/05/2022    Zoster 04/20/2017       Review of patient's allergies indicates:  No Known Allergies    Current Outpatient Medications:     atorvastatin (LIPITOR) 20 MG tablet, Take 1 tablet (20 mg total) by mouth once daily. For cholesterol, Disp: 90 tablet, Rfl: 1    glucosamine-D3-Boswellia serr 1,500-400-100 mg-unit-mg Tab, Take by mouth., Disp: , Rfl:     losartan (COZAAR) 100 MG tablet, Take 1 tablet (100 mg total) by mouth once daily., Disp: 90 tablet, Rfl: 1    multivitamin-iron-folic acid (CENTRUM) Tab, , Disp: , Rfl:     turmeric 400 mg Cap, , Disp: , Rfl:     Review of Systems   Constitutional: Negative for chills and fever.   HENT: Negative for trouble swallowing.    Respiratory: Negative for cough.    Cardiovascular: Negative for chest pain.   Gastrointestinal: Negative for abdominal pain.   Skin: Positive for wound.   Neurological: Negative for syncope and numbness.          Objective:      Vitals:    02/21/22 0750   BP: 130/70   Pulse: 60   Weight: 70.3 kg (155 lb)   Height: 5' 7" (1.702 m)     Physical Exam  Vitals and nursing note reviewed.   Constitutional:       General: He is not in acute distress.     Appearance: Normal appearance. He is well-developed and normal weight.   Cardiovascular:      Rate and Rhythm: Normal rate and regular rhythm.   Pulmonary:      Effort: Pulmonary effort is normal. No respiratory distress.      Breath sounds: Normal breath sounds.   Musculoskeletal:        Hands:    Skin:     General: Skin is warm and dry.      Findings: No rash.   Neurological:      " General: No focal deficit present.      Mental Status: He is alert and oriented to person, place, and time.           Assessment:       1. Laceration of left hand without foreign body, subsequent encounter           Plan:       Laceration of left hand without foreign body, subsequent encounter  Comments:  Sutures removed without difficulty.  Has developed small fissure just distal to the laceration.  Advised to keep clean and apply antibiotic ointment      Follow up if symptoms worsen or fail to improve, for as scheduled.              2/21/2022 Pamela Medina, NP

## 2022-05-05 ENCOUNTER — TELEPHONE (OUTPATIENT)
Dept: FAMILY MEDICINE | Facility: CLINIC | Age: 67
End: 2022-05-05

## 2022-05-05 DIAGNOSIS — R91.1 SOLITARY PULMONARY NODULE: Primary | ICD-10-CM

## 2022-05-05 NOTE — TELEPHONE ENCOUNTER
Spoke to patient to see if he knows who called him because we don't have documention in his chart. Patient states it was on Monday and the name was maybe Kay or Yaquelin. He wasn't quite sure. I tried calling Carlene 3 times to explain to her and the number won't go through. Informed patient that CT will be ordered today and [facility] will be calling to schedule, that this wasn't a walk in test. Verbalized understanding.

## 2022-05-11 ENCOUNTER — HOSPITAL ENCOUNTER (OUTPATIENT)
Dept: RADIOLOGY | Facility: HOSPITAL | Age: 67
Discharge: HOME | End: 2022-05-11
Attending: NURSE PRACTITIONER
Payer: MEDICARE

## 2022-05-11 DIAGNOSIS — R91.8 ABNORMAL CT SCAN OF LUNG: Primary | ICD-10-CM

## 2022-05-11 DIAGNOSIS — R91.1 SOLITARY PULMONARY NODULE: ICD-10-CM

## 2022-05-11 PROCEDURE — 71250 CT THORAX DX C-: CPT | Mod: TC,PO

## 2022-05-12 ENCOUNTER — TELEPHONE (OUTPATIENT)
Dept: FAMILY MEDICINE | Facility: CLINIC | Age: 67
End: 2022-05-12

## 2022-05-12 DIAGNOSIS — R91.8 ABNORMAL CT SCAN OF LUNG: Primary | ICD-10-CM

## 2022-05-12 DIAGNOSIS — R91.1 PULMONARY NODULE: ICD-10-CM

## 2022-05-12 DIAGNOSIS — R91.1 SOLITARY PULMONARY NODULE: ICD-10-CM

## 2022-05-12 NOTE — TELEPHONE ENCOUNTER
----- Message from Pamela Medina NP sent at 5/11/2022  8:44 PM CDT -----  Please call patient and let now CT scan of the chest shows stable reticular nodules within the right middle and lateral lower lobe.  There is also a stable 4 mm nodule in the right middle lobe.  No new nodules, pneumonia or fluid collection seen.  There are no enlarged lymph nodes in the chest.  While these changes are stable and likely chronic I can refer him to Dr. Adams, pulmonary for further evaluation and determine if any further testing is needed.

## 2022-05-12 NOTE — TELEPHONE ENCOUNTER
Spoke to patient with results verbatim per Pamela. Verbalized understanding on all. States he would like to see Dr Dobbs. Gave pt Dr Dobbs name, number, and address. Referral pended. Pamela, the report states f/u in 6 months for stability. Do you want me to create remind me for this?

## 2022-05-27 ENCOUNTER — TELEPHONE (OUTPATIENT)
Dept: PULMONOLOGY | Facility: CLINIC | Age: 67
End: 2022-05-27

## 2022-05-27 NOTE — TELEPHONE ENCOUNTER
----- Message from Cedric Dobbs MD sent at 5/13/2022 10:11 AM CDT -----  Try to find him an appointment in the next month    ----- Message -----  From: RT Lashanda  Sent: 5/12/2022  10:10 AM CDT  To: Cedric Dobbs MD    Patient will be calling for appointment. Referral will follow today.

## 2022-06-09 ENCOUNTER — HOSPITAL ENCOUNTER (EMERGENCY)
Facility: HOSPITAL | Age: 67
Discharge: HOME | End: 2022-06-09
Attending: EMERGENCY MEDICINE
Payer: MEDICARE

## 2022-06-09 VITALS
SYSTOLIC BLOOD PRESSURE: 126 MMHG | WEIGHT: 155 LBS | RESPIRATION RATE: 18 BRPM | HEIGHT: 67 IN | DIASTOLIC BLOOD PRESSURE: 82 MMHG | BODY MASS INDEX: 24.33 KG/M2 | TEMPERATURE: 98 F | OXYGEN SATURATION: 99 % | HEART RATE: 77 BPM

## 2022-06-09 DIAGNOSIS — T15.92XA FOREIGN BODY, EYE, LEFT, INITIAL ENCOUNTER: Primary | ICD-10-CM

## 2022-06-09 DIAGNOSIS — S05.02XA ABRASION OF LEFT CORNEA, INITIAL ENCOUNTER: ICD-10-CM

## 2022-06-09 PROCEDURE — 25000003 PHARM REV CODE 250: Performed by: EMERGENCY MEDICINE

## 2022-06-09 PROCEDURE — 99283 EMERGENCY DEPT VISIT LOW MDM: CPT

## 2022-06-09 PROCEDURE — 65205 REMOVE FOREIGN BODY FROM EYE: CPT | Mod: LT

## 2022-06-09 RX ORDER — TETRACAINE HYDROCHLORIDE 5 MG/ML
2 SOLUTION OPHTHALMIC
Status: COMPLETED | OUTPATIENT
Start: 2022-06-09 | End: 2022-06-09

## 2022-06-09 RX ORDER — ERYTHROMYCIN 5 MG/G
OINTMENT OPHTHALMIC
Qty: 3.5 G | Refills: 0 | Status: SHIPPED | OUTPATIENT
Start: 2022-06-09 | End: 2022-12-08

## 2022-06-09 RX ADMIN — TETRACAINE HYDROCHLORIDE 2 DROP: 5 SOLUTION OPHTHALMIC at 01:06

## 2022-06-09 RX ADMIN — FLUORESCEIN SODIUM 1 EACH: 1 STRIP OPHTHALMIC at 01:06

## 2022-06-09 NOTE — ED PROVIDER NOTES
Encounter Date: 2022       History     Chief Complaint   Patient presents with    Foreign Body in Eye     LEFT, TODAY     66-year-old male presents emergency room with a suspicion that he has a metallic foreign body in his left eye which occur while using motorized saw and cutting metal.  No changes in visual acuity does have a mild foreign body sensation.  No bleeding from the eye.  Denies any other injuries except that.  He denies any sensation of foreign body under the lid.  Tetanus is up-to-date.        Review of patient's allergies indicates:  No Known Allergies  Past Medical History:   Diagnosis Date    Hypertension     Kidney stone     Mixed hyperlipidemia      Past Surgical History:   Procedure Laterality Date    FRACTURE SURGERY  3/2013    [facility]  syndrone.  Ulna reduction surgery    HERNIA REPAIR      neck fusion      C5-C6    SPINE SURGERY  2012    Fusion of c5  & c6    ulnar reduction Right      Family History   Problem Relation Age of Onset    Cancer Father         Colon cancer    Hypertension Father     Cancer Mother         Colon cancer    Diabetes Mother     Hypertension Mother     Cancer Brother         Stage 4 Melanoma    Diabetes Brother          of heart attach because of diabetes    Heart disease Brother      Social History     Tobacco Use    Smoking status: Never Smoker    Smokeless tobacco: Never Used   Substance Use Topics    Alcohol use: Yes     Alcohol/week: 2.0 standard drinks     Types: 2 Glasses of wine per week     Comment: socially    Drug use: Never     Review of Systems   Eyes: Positive for pain. Negative for photophobia, discharge, redness, itching and visual disturbance.   Skin: Negative.    Neurological: Negative for headaches.   All other systems reviewed and are negative.      Physical Exam     Initial Vitals [22 1203]   BP Pulse Resp Temp SpO2   (!) 138/90 71 18 98.3 °F (36.8 °C) 99 %      MAP       --         Physical Exam    Vitals  reviewed.  Constitutional: He appears well-developed and well-nourished.   HENT:   Head: Normocephalic and atraumatic.   Eyes: Conjunctivae and EOM are normal. Pupils are equal, round, and reactive to light. Right eye exhibits no discharge. Left eye exhibits no discharge. No scleral icterus.   Examination left eye reveals a metallic foreign body located approximately 9:00 o'clock. in the area of the cornea and conjunctiva.  Extraocular muscles are intact.  Lids dye in that locale of the metal visualized.  No hyphema.  No chemosis.    The patient's foreign body was removed with a tetracaine moistened Q-tip without difficulty.  He will be able to be discharged and placed on erythromycin ophthalmic ointment and p.r.n. follow-up with Ophthalmology.           ED Course   Procedures  Labs Reviewed - No data to display       Imaging Results    None          Medications   TETRAcaine HCl (PF) 0.5 % Drop 2 drop (2 drops Right Eye Given by Provider 6/9/22 5124)   fluorescein ophthalmic strip 1 each (1 each Right Eye Given by Provider 6/9/22 4525)                Attending Attestation:             Attending ED Notes:   This 66-year-old male had a metallic foreign body in his left eye has since been removed.  Tetanus status is up-to-date.  The patient will be given topical ophthalmic antibiotics and will be referred follow-up to Dr. Marsh as needed.               Clinical Impression:   Final diagnoses:  [T15.92XA] Foreign body, eye, left, initial encounter (Primary)  [S05.02XA] Abrasion of left cornea, initial encounter          ED Disposition Condition    Discharge Stable        ED Prescriptions     Medication Sig Dispense Start Date End Date Auth. Provider    erythromycin (ROMYCIN) ophthalmic ointment Place a 1/2 inch ribbon of ointment into the lower eyelid. 3.5 g 6/9/2022  Yoandy Barcenas Jr., MD        Follow-up Information    None          Yoandy Barcenas Jr., MD  06/09/22 5265

## 2022-06-29 ENCOUNTER — OFFICE VISIT (OUTPATIENT)
Dept: PULMONOLOGY | Facility: CLINIC | Age: 67
End: 2022-06-29
Payer: MEDICARE

## 2022-06-29 VITALS
BODY MASS INDEX: 24.43 KG/M2 | OXYGEN SATURATION: 97 % | DIASTOLIC BLOOD PRESSURE: 80 MMHG | HEART RATE: 72 BPM | WEIGHT: 156 LBS | SYSTOLIC BLOOD PRESSURE: 142 MMHG

## 2022-06-29 DIAGNOSIS — R93.89 ABNORMAL CT OF THE CHEST: ICD-10-CM

## 2022-06-29 DIAGNOSIS — R91.8 ABNORMAL CT SCAN OF LUNG: ICD-10-CM

## 2022-06-29 PROCEDURE — 99203 OFFICE O/P NEW LOW 30 MIN: CPT | Mod: S$GLB,,, | Performed by: INTERNAL MEDICINE

## 2022-06-29 PROCEDURE — 99203 PR OFFICE/OUTPT VISIT, NEW, LEVL III, 30-44 MIN: ICD-10-PCS | Mod: S$GLB,,, | Performed by: INTERNAL MEDICINE

## 2022-06-29 NOTE — ASSESSMENT & PLAN NOTE
· 4 mm nodule is likely benign and few scattered inflammatory findings are likely nonspecific  · He is asymptomatic  · Will repeat CT scan in 6 months and if stable then will need a follow up in 1 year  · RTC 6 months

## 2022-06-29 NOTE — PROGRESS NOTES
New Office Visit/Consultation Note *    Patient Name: Myron Iqbal  MRN: [id number]  : 1955      Reason for visit: abnormal CT chest    HPI:     2022 - 65 yo male with no prior pulmonary history had kidney stone and had CT which showed a small nodule min RML and a few inflammatory changes.  Repeat CT scan was stable (see report).  He has no respiratory symptoms.  He has never been a smoker and has no known toxic exposures.  There is a family h/o melanoma and colon cancer.    Past Medical History    Past Medical History:   Diagnosis Date    Hypertension     Kidney stone     Mixed hyperlipidemia        Past Surgical History    Past Surgical History:   Procedure Laterality Date    FRACTURE SURGERY  3/2013    [facility]  syndrone.  Ulna reduction surgery    HERNIA REPAIR      neck fusion      C5-C6    SPINE SURGERY  2012    Fusion of c5  & c6    ulnar reduction Right        Medications      Current Outpatient Medications:     atorvastatin (LIPITOR) 20 MG tablet, Take 1 tablet (20 mg total) by mouth once daily. For cholesterol, Disp: 90 tablet, Rfl: 1    glucosamine-D3-Boswellia serr 1,500-400-100 mg-unit-mg Tab, Take by mouth., Disp: , Rfl:     losartan (COZAAR) 100 MG tablet, Take 1 tablet (100 mg total) by mouth once daily., Disp: 90 tablet, Rfl: 1    multivitamin-iron-folic acid (CENTRUM) Tab, , Disp: , Rfl:     turmeric 400 mg Cap, , Disp: , Rfl:     erythromycin (ROMYCIN) ophthalmic ointment, Place a 1/2 inch ribbon of ointment into the lower eyelid. (Patient not taking: Reported on 2022), Disp: 3.5 g, Rfl: 0    Allergies    Review of patient's allergies indicates:  No Known Allergies    SocHx    Social History     Tobacco Use   Smoking Status Never Smoker   Smokeless Tobacco Never Used       Social History     Substance and Sexual Activity   Alcohol Use Yes    Alcohol/week: 2.0 standard drinks    Types: 2 Glasses of wine per week    Comment: socially       Drug Use - no  Occupation -  did lab work  Asbestos exposure - no  Pets - dog    FMHx    Family History   Problem Relation Age of Onset    Cancer Father         Colon cancer    Hypertension Father     Cancer Mother         Colon cancer    Diabetes Mother     Hypertension Mother     Cancer Brother         Stage 4 Melanoma    Diabetes Brother          of heart attach because of diabetes    Heart disease Brother          Review of Systems  Review of Systems   Constitutional: Negative for chills, diaphoresis, fever, malaise/fatigue and weight loss.   HENT: Negative for congestion.    Eyes: Negative for pain.   Respiratory: Negative for cough, hemoptysis, sputum production, shortness of breath, wheezing and stridor.    Cardiovascular: Negative for chest pain, palpitations, orthopnea, claudication, leg swelling and PND.   Gastrointestinal: Negative for abdominal pain, constipation, diarrhea, heartburn, nausea and vomiting.   Genitourinary: Negative for dysuria, frequency and urgency.   Musculoskeletal: Negative for falls and myalgias.   Neurological: Negative for sensory change, focal weakness and weakness.   Psychiatric/Behavioral: Negative for depression, substance abuse and suicidal ideas. The patient is not nervous/anxious.        Physical Exam    Vitals:    22 1308   BP: (!) 142/80   BP Location: Left arm   Patient Position: Sitting   Pulse: 72   SpO2: 97%   Weight: 70.8 kg (156 lb)       Physical Exam  Vitals and nursing note reviewed.   Constitutional:       General: He is not in acute distress.     Appearance: He is well-developed. He is not ill-appearing, toxic-appearing or diaphoretic.   HENT:      Head: Normocephalic and atraumatic.      Right Ear: External ear normal.      Left Ear: External ear normal.      Nose: Nose normal.   Eyes:      General: No scleral icterus.        Right eye: No discharge.         Left eye: No discharge.      Extraocular Movements: Extraocular movements intact.      Conjunctiva/sclera:  Conjunctivae normal.      Pupils: Pupils are equal, round, and reactive to light.   Neck:      Thyroid: No thyromegaly.      Vascular: No JVD.      Trachea: No tracheal deviation.   Cardiovascular:      Rate and Rhythm: Normal rate and regular rhythm.      Heart sounds: Normal heart sounds. No murmur heard.    No friction rub. No gallop.   Pulmonary:      Effort: Pulmonary effort is normal. No respiratory distress.      Breath sounds: Normal breath sounds. No stridor. No wheezing, rhonchi or rales.   Chest:      Chest wall: No tenderness.   Abdominal:      General: Bowel sounds are normal. There is no distension.      Palpations: Abdomen is soft.      Tenderness: There is no abdominal tenderness. There is no guarding.   Musculoskeletal:         General: No tenderness. Normal range of motion.      Cervical back: Normal range of motion and neck supple. No rigidity.      Right lower leg: No edema.      Left lower leg: No edema.   Lymphadenopathy:      Cervical: No cervical adenopathy.   Neurological:      General: No focal deficit present.      Mental Status: He is alert and oriented to person, place, and time. Mental status is at baseline.      Cranial Nerves: No cranial nerve deficit.      Motor: No weakness.      Deep Tendon Reflexes: Reflexes are normal and symmetric.   Psychiatric:         Mood and Affect: Mood normal.         Behavior: Behavior normal.         Thought Content: Thought content normal.         Judgment: Judgment normal.         Labs    Lab Results   Component Value Date    WBC 4.8 02/02/2022    HGB 14.6 02/02/2022    HCT 45.0 02/02/2022     02/02/2022       Sodium   Date Value Ref Range Status   02/02/2022 138 135 - 146 mmol/L Final     Potassium   Date Value Ref Range Status   02/02/2022 4.2 3.5 - 5.3 mmol/L Final     Chloride   Date Value Ref Range Status   02/02/2022 104 98 - 110 mmol/L Final     CO2   Date Value Ref Range Status   02/02/2022 28 20 - 32 mmol/L Final     Glucose   Date  Value Ref Range Status   02/02/2022 97 65 - 99 mg/dL Final     Comment:                   Fasting reference interval          BUN   Date Value Ref Range Status   02/02/2022 21 7 - 25 mg/dL Final     Creatinine   Date Value Ref Range Status   02/02/2022 0.85 0.70 - 1.25 mg/dL Final     Comment:     For patients >49 years of age, the reference limit  for Creatinine is approximately 13% higher for people  identified as -American.          Calcium   Date Value Ref Range Status   02/02/2022 9.2 8.6 - 10.3 mg/dL Final     Total Protein   Date Value Ref Range Status   02/02/2022 6.4 6.1 - 8.1 g/dL Final     Albumin   Date Value Ref Range Status   02/02/2022 4.0 3.6 - 5.1 g/dL Final     Total Bilirubin   Date Value Ref Range Status   02/02/2022 0.8 0.2 - 1.2 mg/dL Final     Alkaline Phosphatase   Date Value Ref Range Status   08/03/2020 87 35 - 144 U/L Final     AST   Date Value Ref Range Status   02/02/2022 30 10 - 35 U/L Final     ALT   Date Value Ref Range Status   02/02/2022 37 9 - 46 U/L Final       Xrays    CMS MANDATED QUALITY DATA - CT RADIATION - 436     All CT scans at this facility utilize dose modulation, iterative reconstruction, and/or weight based dosing when appropriate to reduce radiation dose to as low as reasonably achievable.           Reason: Pulmonary nodule     TECHNIQUE: CT thorax without contrast     COMPARISON: 11/4/2021     CT THORAX:  There are stable reticular nodular opacities within the right middle lobe and lateral right lower lobe. There is stable mucous plugging within the superior right middle lobe.     There is a stable 4 mm fissural nodule along the minor fissure in the right middle lobe.  There are no new pulmonary nodules, infiltrates or pleural effusions. The remainder of the bronchi and trachea are normal.     The heart is normal in size. The aorta is normal in caliber. There is coronary artery calcification. There is no hilar, mediastinal or axillary adenopathy.     The  esophagus is normal.     There are multiple low-attenuation lesions in the liver compatible with cysts. There is calcified gallstone. The adrenal glands are normal. There are no acute osseous abnormalities.     IMPRESSION:  Stable reticular nodular opacities within the right middle lobe and right lower lobe with mucous plugging in the right middle lobe     Stable 4 mm fissural nodule along the minor fissure     Follow-up CT chest in six months is recommended to document stability     Electronically signed by:  Lucila Caceres MD  5/11/2022 9:47 AM CDT Workstation: [id number]    Impression/Plan    Problem List Items Addressed This Visit        Other    Abnormal CT of the chest     · 4 mm nodule is likely benign and few scattered inflammatory findings are likely nonspecific  · He is asymptomatic  · Will repeat CT scan in 6 months and if stable then will need a follow up in 1 year  · RTC 6 months           Relevant Orders    CT Chest Without Contrast      Other Visit Diagnoses     Abnormal CT scan of lung        Relevant Orders    CT Chest Without Contrast          I have spent about 30 minutes with the patient taking the history and examining the patient.  We have discussed the diagnoses and current plan and all questions have been answered.  We have discussed the follow up plan.  The patient and family (if present) know to contact the office with any questions they may have.        Cedric Dobbs MD

## 2022-08-08 ENCOUNTER — OFFICE VISIT (OUTPATIENT)
Dept: FAMILY MEDICINE | Facility: CLINIC | Age: 67
End: 2022-08-08
Payer: MEDICARE

## 2022-08-08 VITALS
BODY MASS INDEX: 24.74 KG/M2 | HEART RATE: 61 BPM | OXYGEN SATURATION: 100 % | HEIGHT: 67 IN | WEIGHT: 157.63 LBS | DIASTOLIC BLOOD PRESSURE: 62 MMHG | SYSTOLIC BLOOD PRESSURE: 136 MMHG

## 2022-08-08 DIAGNOSIS — R91.8 ABNORMAL CT SCAN OF LUNG: ICD-10-CM

## 2022-08-08 DIAGNOSIS — E78.2 MIXED HYPERLIPIDEMIA: ICD-10-CM

## 2022-08-08 DIAGNOSIS — I10 ESSENTIAL HYPERTENSION: Primary | ICD-10-CM

## 2022-08-08 DIAGNOSIS — Z12.5 PROSTATE CANCER SCREENING: ICD-10-CM

## 2022-08-08 PROCEDURE — 99214 OFFICE O/P EST MOD 30 MIN: CPT | Mod: S$GLB,,, | Performed by: NURSE PRACTITIONER

## 2022-08-08 PROCEDURE — 99214 PR OFFICE/OUTPT VISIT, EST, LEVL IV, 30-39 MIN: ICD-10-PCS | Mod: S$GLB,,, | Performed by: NURSE PRACTITIONER

## 2022-08-08 RX ORDER — LOSARTAN POTASSIUM 100 MG/1
100 TABLET ORAL DAILY
Qty: 90 TABLET | Refills: 1 | Status: SHIPPED | OUTPATIENT
Start: 2022-08-08 | End: 2023-02-08 | Stop reason: SDUPTHER

## 2022-08-08 RX ORDER — ATORVASTATIN CALCIUM 20 MG/1
20 TABLET, FILM COATED ORAL DAILY
Qty: 90 TABLET | Refills: 1 | Status: SHIPPED | OUTPATIENT
Start: 2022-08-08 | End: 2023-02-08 | Stop reason: SDUPTHER

## 2022-08-08 NOTE — PROGRESS NOTES
SUBJECTIVE:    Patient ID: Myron Iqbal is a 67 y.o. male.    Chief Complaint: Follow-up (No bottles//Pt is here for a 6 month check up and medication refills.//Darlene )    Patient here for regular follow-up-hypertension/lipids.  Patient reports overall he has been doing well.    No complaints today.  Saw Dr. Dobbs, pulmonary for abnormal CT scan of the chest.  He recommended repeat in 6 months      No visits with results within 6 Month(s) from this visit.   Latest known visit with results is:   Office Visit on 08/04/2021   Component Date Value Ref Range Status    WBC 02/02/2022 4.8  3.8 - 10.8 Thousand/uL Final    RBC 02/02/2022 5.02  4.20 - 5.80 Million/uL Final    Hemoglobin 02/02/2022 14.6  13.2 - 17.1 g/dL Final    Hematocrit 02/02/2022 45.0  38.5 - 50.0 % Final    MCV 02/02/2022 89.6  80.0 - 100.0 fL Final    MCH 02/02/2022 29.1  27.0 - 33.0 pg Final    MCHC 02/02/2022 32.4  32.0 - 36.0 g/dL Final    RDW 02/02/2022 12.8  11.0 - 15.0 % Final    Platelets 02/02/2022 215  140 - 400 Thousand/uL Final    MPV 02/02/2022 10.5  7.5 - 12.5 fL Final    Neutrophils, Abs 02/02/2022 2,554  1,500 - 7,800 cells/uL Final    Lymph # 02/02/2022 1,440  850 - 3,900 cells/uL Final    Mono # 02/02/2022 494  200 - 950 cells/uL Final    Eos # 02/02/2022 293  15 - 500 cells/uL Final    Baso # 02/02/2022 19  0 - 200 cells/uL Final    Neutrophils Relative 02/02/2022 53.2  % Final    Lymph % 02/02/2022 30.0  % Final    Mono % 02/02/2022 10.3  % Final    Eosinophil % 02/02/2022 6.1  % Final    Basophil % 02/02/2022 0.4  % Final    Glucose 02/02/2022 97  65 - 99 mg/dL Final    BUN 02/02/2022 21  7 - 25 mg/dL Final    Creatinine 02/02/2022 0.85  0.70 - 1.25 mg/dL Final    eGFR if non African American 02/02/2022 90  > OR = 60 mL/min/1.73m2 Final    eGFR if  02/02/2022 104  > OR = 60 mL/min/1.73m2 Final    BUN/Creatinine Ratio 02/02/2022 NOT APPLICABLE  6 - 22 (calc) Final    Sodium 02/02/2022 138   135 - 146 mmol/L Final    Potassium 02/02/2022 4.2  3.5 - 5.3 mmol/L Final    Chloride 02/02/2022 104  98 - 110 mmol/L Final    CO2 02/02/2022 28  20 - 32 mmol/L Final    Calcium 02/02/2022 9.2  8.6 - 10.3 mg/dL Final    Total Protein 02/02/2022 6.4  6.1 - 8.1 g/dL Final    Albumin 02/02/2022 4.0  3.6 - 5.1 g/dL Final    Globulin, Total 02/02/2022 2.4  1.9 - 3.7 g/dL (calc) Final    Albumin/Globulin Ratio 02/02/2022 1.7  1.0 - 2.5 (calc) Final    Total Bilirubin 02/02/2022 0.8  0.2 - 1.2 mg/dL Final    Alkaline Phosphatase 02/02/2022 79  35 - 144 U/L Final    AST 02/02/2022 30  10 - 35 U/L Final    ALT 02/02/2022 37  9 - 46 U/L Final    Cholesterol 02/02/2022 107  <200 mg/dL Final    HDL 02/02/2022 55  > OR = 40 mg/dL Final    Triglycerides 02/02/2022 68  <150 mg/dL Final    LDL Cholesterol 02/02/2022 38  mg/dL (calc) Final    HDL/Cholesterol Ratio 02/02/2022 1.9  <5.0 (calc) Final    Non HDL Chol. (LDL+VLDL) 02/02/2022 52  <130 mg/dL (calc) Final    Color, UA 02/02/2022 YELLOW  YELLOW Final    Appearance, UA 02/02/2022 CLEAR  CLEAR Final    Specific Gravity, UA 02/02/2022 1.018  1.001 - 1.035 Final    pH, UA 02/02/2022 6.5  5.0 - 8.0 Final    Glucose, UA 02/02/2022 NEGATIVE  NEGATIVE Final    Bilirubin, UA 02/02/2022 NEGATIVE  NEGATIVE Final    Ketones, UA 02/02/2022 NEGATIVE  NEGATIVE Final    Occult Blood UA 02/02/2022 NEGATIVE  NEGATIVE Final    Protein, UA 02/02/2022 NEGATIVE  NEGATIVE Final    Nitrite, UA 02/02/2022 NEGATIVE  NEGATIVE Final    Leukocytes, UA 02/02/2022 NEGATIVE  NEGATIVE Final    WBC Casts, UA 02/02/2022 NONE SEEN  < OR = 5 /HPF Final    RBC Casts, UA 02/02/2022 NONE SEEN  < OR = 2 /HPF Final    Squam Epithel, UA 02/02/2022 NONE SEEN  < OR = 5 /HPF Final    Bacteria, UA 02/02/2022 NONE SEEN  NONE SEEN /HPF Final    Hyaline Casts, UA 02/02/2022 NONE SEEN  NONE SEEN /LPF Final    Reflexive Urine Culture 02/02/2022    Final    TSH w/reflex to FT4 02/02/2022  0.98  0.40 - 4.50 mIU/L Final    PROSTATE SPECIFIC ANTIGEN, SCR - Q* 2022 1.46  < OR = 4.00 ng/mL Final    Creatinine, Urine 2022 105  20 - 320 mg/dL Final    Microalb, Ur 2022 0.3  See Note: mg/dL Final    Microalb/Creat Ratio 2022 3  <30 mcg/mg creat Final       Past Medical History:   Diagnosis Date    Hypertension     Kidney stone     Mixed hyperlipidemia      Past Surgical History:   Procedure Laterality Date    FRACTURE SURGERY  3/2013    [facility]  syndrone.  Ulna reduction surgery    HERNIA REPAIR      neck fusion      C5-C6    SPINE SURGERY  2012    Fusion of c5  & c6    ulnar reduction Right      Family History   Problem Relation Age of Onset    Cancer Father         Colon cancer    Hypertension Father     Cancer Mother         Colon cancer    Diabetes Mother     Hypertension Mother     Cancer Brother         Stage 4 Melanoma    Diabetes Brother          of heart attach because of diabetes    Heart disease Brother        The 10-year CVD risk score (Mercedes, et al., 2008) is: 14.2%    Values used to calculate the score:      Age: 67 years      Sex: Male      Diabetic: No      Tobacco smoker: No      Systolic Blood Pressure: 136 mmHg      Is BP treated: Yes      HDL Cholesterol: 55 mg/dL      Total Cholesterol: 107 mg/dL     Marital Status: Single  Alcohol History:  reports current alcohol use of about 2.0 standard drinks of alcohol per week.  Tobacco History:  reports that he has never smoked. He has never used smokeless tobacco.  Drug History:  reports no history of drug use.    Health Maintenance Topics with due status: Not Due       Topic Last Completion Date    Colorectal Cancer Screening 2020    Influenza Vaccine 09/15/2020    Lipid Panel 2022    TETANUS VACCINE 2022     Immunization History   Administered Date(s) Administered    COVID-19, MRNA, LN-S, PF (MODERNA FULL 0.5 ML DOSE) 2021, 2021    COVID-19, MRNA, LN-S, PF  (Pfizer) (Purple Cap) 10/28/2021    Influenza 12/29/2014, 01/04/2016, 09/15/2020    Influenza (FLUAD) - Quadrivalent - Adjuvanted - PF *Preferred* (65+) 09/01/2020    Influenza (FLUAD) - Trivalent - Adjuvanted - PF (65+) 09/23/2019    Influenza - Quadrivalent - PF *Preferred* (6 months and older) 10/02/2017, 12/09/2018    Pneumococcal Conjugate - 13 Valent 08/15/2019    Pneumococcal Polysaccharide - 23 Valent 09/01/2020    Td (ADULT) 05/03/2018    Tdap 02/05/2022    Zoster 04/20/2017       Review of patient's allergies indicates:  No Known Allergies    Current Outpatient Medications:     atorvastatin (LIPITOR) 20 MG tablet, Take 1 tablet (20 mg total) by mouth once daily. For cholesterol, Disp: 90 tablet, Rfl: 1    erythromycin (ROMYCIN) ophthalmic ointment, Place a 1/2 inch ribbon of ointment into the lower eyelid. (Patient not taking: Reported on 6/29/2022), Disp: 3.5 g, Rfl: 0    glucosamine-D3-Boswellia serr 1,500-400-100 mg-unit-mg Tab, Take by mouth., Disp: , Rfl:     losartan (COZAAR) 100 MG tablet, Take 1 tablet (100 mg total) by mouth once daily., Disp: 90 tablet, Rfl: 1    multivitamin-iron-folic acid (CENTRUM) Tab, , Disp: , Rfl:     turmeric 400 mg Cap, , Disp: , Rfl:     Review of Systems   Constitutional: Negative for appetite change, chills, fever and unexpected weight change.   HENT: Negative for sore throat and trouble swallowing.    Eyes: Negative for visual disturbance.   Respiratory: Negative for cough, shortness of breath and wheezing.    Cardiovascular: Negative for chest pain, palpitations and leg swelling.   Gastrointestinal: Negative for abdominal pain, constipation and diarrhea.   Genitourinary: Negative for dysuria, frequency and hematuria.   Musculoskeletal: Negative for back pain and gait problem.   Skin: Negative for rash.   Neurological: Negative for dizziness, syncope and numbness.   Psychiatric/Behavioral: Negative for dysphoric mood. The patient is not  "nervous/anxious.           Objective:      Vitals:    08/08/22 0911   BP: 136/62   Pulse: 61   SpO2: 100%   Weight: 71.5 kg (157 lb 9.6 oz)   Height: 5' 7" (1.702 m)     Physical Exam  Vitals and nursing note reviewed.   Constitutional:       General: He is not in acute distress.     Appearance: Normal appearance. He is well-developed and normal weight.   HENT:      Head: Normocephalic and atraumatic.      Right Ear: Tympanic membrane and ear canal normal.      Left Ear: Tympanic membrane and ear canal normal.   Neck:      Vascular: No carotid bruit.   Cardiovascular:      Rate and Rhythm: Normal rate and regular rhythm.      Heart sounds: No murmur heard.    No friction rub. No gallop.   Pulmonary:      Effort: Pulmonary effort is normal. No respiratory distress.      Breath sounds: Normal breath sounds. No wheezing or rales.   Abdominal:      General: There is no distension.      Palpations: Abdomen is soft.      Tenderness: There is no abdominal tenderness.   Musculoskeletal:      Cervical back: Neck supple.      Right lower leg: No edema.      Left lower leg: No edema.   Lymphadenopathy:      Cervical: No cervical adenopathy.   Skin:     General: Skin is warm and dry.      Findings: No rash.   Neurological:      General: No focal deficit present.      Mental Status: He is alert and oriented to person, place, and time.      Gait: Gait normal.   Psychiatric:         Mood and Affect: Mood normal.           Assessment:       1. Essential hypertension    2. Mixed hyperlipidemia    3. Abnormal CT scan of lung    4. Prostate cancer screening           Plan:       Essential hypertension  Comments:  BP well controlled  Orders:  -     CBC Auto Differential; Future; Expected date: 08/08/2022  -     Microalbumin/Creatinine Ratio, Urine; Future; Expected date: 08/08/2022  -     TSH w/reflex to FT4; Future; Expected date: 08/08/2022  -     Urinalysis, Reflex to Urine Culture Urine, Clean Catch; Future; Expected date: " 08/08/2022  -     losartan (COZAAR) 100 MG tablet; Take 1 tablet (100 mg total) by mouth once daily.  Dispense: 90 tablet; Refill: 1    Mixed hyperlipidemia  Comments:  Well controlled on last labs  Orders:  -     Comprehensive Metabolic Panel; Future; Expected date: 08/08/2022  -     Lipid Panel; Future; Expected date: 08/08/2022  -     atorvastatin (LIPITOR) 20 MG tablet; Take 1 tablet (20 mg total) by mouth once daily. For cholesterol  Dispense: 90 tablet; Refill: 1    Abnormal CT scan of lung  Comments:  Repeat in November per Dr. Dobbs's orders    Prostate cancer screening  -     PSA, Screening; Future; Expected date: 08/08/2022      Follow up in about 6 months (around 2/8/2023) for HTN, lipids, Labs before next visit.          Counseled on age and gender appropriate medical preventative services, including cancer screenings, immunizations, overall nutritional health, need for a consistent exercise regimen and an overall push towards maintaining a vigorous and active lifestyle.      8/8/2022 Pamela Medina NP

## 2022-11-07 ENCOUNTER — PATIENT MESSAGE (OUTPATIENT)
Dept: FAMILY MEDICINE | Facility: CLINIC | Age: 67
End: 2022-11-07

## 2022-11-08 ENCOUNTER — PATIENT MESSAGE (OUTPATIENT)
Dept: FAMILY MEDICINE | Facility: CLINIC | Age: 67
End: 2022-11-08

## 2022-11-14 ENCOUNTER — HOSPITAL ENCOUNTER (OUTPATIENT)
Dept: RADIOLOGY | Facility: HOSPITAL | Age: 67
Discharge: HOME | End: 2022-11-14
Attending: INTERNAL MEDICINE
Payer: MEDICARE

## 2022-11-14 ENCOUNTER — PATIENT MESSAGE (OUTPATIENT)
Dept: FAMILY MEDICINE | Facility: CLINIC | Age: 67
End: 2022-11-14

## 2022-11-14 DIAGNOSIS — R93.89 ABNORMAL CT OF THE CHEST: Primary | ICD-10-CM

## 2022-11-14 DIAGNOSIS — R91.8 ABNORMAL CT SCAN OF LUNG: ICD-10-CM

## 2022-11-14 DIAGNOSIS — R93.89 ABNORMAL CT OF THE CHEST: ICD-10-CM

## 2022-11-14 DIAGNOSIS — K40.90 INGUINAL HERNIA WITHOUT OBSTRUCTION OR GANGRENE, RECURRENCE NOT SPECIFIED, UNSPECIFIED LATERALITY: Primary | ICD-10-CM

## 2022-11-14 PROCEDURE — 71250 CT THORAX DX C-: CPT | Mod: TC,PO

## 2022-12-08 ENCOUNTER — OFFICE VISIT (OUTPATIENT)
Dept: SURGERY | Facility: CLINIC | Age: 67
End: 2022-12-08
Payer: MEDICARE

## 2022-12-08 VITALS
HEART RATE: 58 BPM | DIASTOLIC BLOOD PRESSURE: 91 MMHG | BODY MASS INDEX: 24.33 KG/M2 | WEIGHT: 155 LBS | SYSTOLIC BLOOD PRESSURE: 176 MMHG | TEMPERATURE: 98 F | HEIGHT: 67 IN

## 2022-12-08 DIAGNOSIS — R10.32 LEFT INGUINAL PAIN: Primary | ICD-10-CM

## 2022-12-08 DIAGNOSIS — Z98.890 HISTORY OF LEFT INGUINAL HERNIA REPAIR: ICD-10-CM

## 2022-12-08 DIAGNOSIS — Z87.19 HISTORY OF LEFT INGUINAL HERNIA REPAIR: ICD-10-CM

## 2022-12-08 DIAGNOSIS — K40.90 INGUINAL HERNIA WITHOUT OBSTRUCTION OR GANGRENE, RECURRENCE NOT SPECIFIED, UNSPECIFIED LATERALITY: ICD-10-CM

## 2022-12-08 PROCEDURE — 99204 OFFICE O/P NEW MOD 45 MIN: CPT | Mod: S$GLB,,, | Performed by: SURGERY

## 2022-12-08 PROCEDURE — 99204 PR OFFICE/OUTPT VISIT, NEW, LEVL IV, 45-59 MIN: ICD-10-PCS | Mod: S$GLB,,, | Performed by: SURGERY

## 2022-12-08 NOTE — PROGRESS NOTES
Subjective:       Patient ID: Myron Iqbal is a 67 y.o. male.    Chief Complaint: Consult (Lt Inguinal Hernia)      HPI:  67 year old male referred to the office with acute onset of left inguinal pain at site of previous inguinal hernia whole lifting logs one month ago. He had LIH repair - unknown mesh in . He went to an urgent care where possible hernia was palpated. Since that time Pain has resolved. He does not feel or notice any bulging. No obstructive symptoms. Also past surg history of RIH repair with mesh in .    Past Medical History:   Diagnosis Date    Hypertension     Kidney stone     Mixed hyperlipidemia      Past Surgical History:   Procedure Laterality Date    FRACTURE SURGERY  3/2013    [facility]  syndrone.  Ulna reduction surgery    HERNIA REPAIR      neck fusion      C5-C6    SPINE SURGERY  2012    Fusion of c5  & c6    ulnar reduction Right      Review of patient's allergies indicates:  No Known Allergies  Medication List with Changes/Refills   Current Medications    ATORVASTATIN (LIPITOR) 20 MG TABLET    Take 1 tablet (20 mg total) by mouth once daily. For cholesterol    GLUCOSAMINE-D3-BOSWELLIA SERR 1,500-400-100 MG-UNIT-MG TAB    Take by mouth.    LOSARTAN (COZAAR) 100 MG TABLET    Take 1 tablet (100 mg total) by mouth once daily.    MULTIVITAMIN-IRON-FOLIC ACID (CENTRUM) TAB        TURMERIC 400 MG CAP       Discontinued Medications    ERYTHROMYCIN (ROMYCIN) OPHTHALMIC OINTMENT    Place a 1/2 inch ribbon of ointment into the lower eyelid.     Family History   Problem Relation Age of Onset    Cancer Father         Colon cancer    Hypertension Father     Cancer Mother         Colon cancer    Diabetes Mother     Hypertension Mother     Cancer Brother         Stage 4 Melanoma    Diabetes Brother          of heart attach because of diabetes    Heart disease Brother      Social History     Socioeconomic History    Marital status: Single   Tobacco Use    Smoking status: Never    Smokeless  tobacco: Never   Substance and Sexual Activity    Alcohol use: Yes     Alcohol/week: 2.0 standard drinks     Types: 2 Glasses of wine per week     Comment: socially    Drug use: Never    Sexual activity: Yes     Partners: Male     Birth control/protection: None     Social Determinants of Health     Financial Resource Strain: Low Risk     Difficulty of Paying Living Expenses: Not hard at all   Food Insecurity: No Food Insecurity    Worried About Running Out of Food in the Last Year: Never true    Ran Out of Food in the Last Year: Never true   Transportation Needs: No Transportation Needs    Lack of Transportation (Medical): No    Lack of Transportation (Non-Medical): No   Physical Activity: Sufficiently Active    Days of Exercise per Week: 3 days    Minutes of Exercise per Session: 70 min   Stress: No Stress Concern Present    Feeling of Stress : Only a little   Social Connections: Unknown    Frequency of Communication with Friends and Family: Three times a week    Frequency of Social Gatherings with Friends and Family: Once a week    Active Member of Clubs or Organizations: No    Attends Club or Organization Meetings: Never    Marital Status: Living with partner   Housing Stability: Low Risk     Unable to Pay for Housing in the Last Year: No    Number of Places Lived in the Last Year: 2    Unstable Housing in the Last Year: No         Review of Systems   Constitutional:  Negative for appetite change, chills, fever and unexpected weight change.   HENT:  Negative for hearing loss, rhinorrhea, sore throat and voice change.    Eyes:  Negative for photophobia and visual disturbance.   Respiratory:  Negative for cough, choking and shortness of breath.    Cardiovascular:  Negative for chest pain, palpitations and leg swelling.   Gastrointestinal:  Negative for abdominal pain, blood in stool, constipation, diarrhea, nausea and vomiting.   Endocrine: Negative for cold intolerance, heat intolerance, polydipsia and polyuria.    Musculoskeletal:  Negative for arthralgias, back pain, joint swelling and neck stiffness.   Skin:  Negative for color change, pallor and rash.   Neurological:  Negative for dizziness, seizures, syncope and headaches.   Hematological:  Negative for adenopathy. Does not bruise/bleed easily.   Psychiatric/Behavioral:  Negative for agitation, behavioral problems and confusion.      Objective:      Physical Exam  Constitutional:       General: He is awake. He is not in acute distress.     Appearance: Normal appearance. He is normal weight. He is not ill-appearing or toxic-appearing.   HENT:      Head: Normocephalic and atraumatic.   Pulmonary:      Effort: Pulmonary effort is normal. No tachypnea, bradypnea, accessory muscle usage or respiratory distress.   Abdominal:      General: A surgical scar is present. There is no distension.      Palpations: Abdomen is soft.      Tenderness: There is no abdominal tenderness.          Comments: Did not appreciate definite hernia on exam    Musculoskeletal:      Cervical back: Neck supple.   Neurological:      Mental Status: He is alert and oriented to person, place, and time.   Psychiatric:         Behavior: Behavior is cooperative.       Assessment/Plan:   Myron was seen today for consult.    Diagnoses and all orders for this visit:    Left inguinal pain  -     CT Pelvis With Contrast; Future  -     Creatinine, serum; Future    History of left inguinal hernia repair  -     CT Pelvis With Contrast; Future      Did not appreciate definite hernia on exam. Now asymptomatic. Will order imaging to try to further evaluate. Follow up after imaging.

## 2022-12-09 ENCOUNTER — HOSPITAL ENCOUNTER (OUTPATIENT)
Dept: RADIOLOGY | Facility: HOSPITAL | Age: 67
Discharge: HOME | End: 2022-12-09
Attending: SURGERY
Payer: MEDICARE

## 2022-12-09 DIAGNOSIS — Z87.19 HISTORY OF LEFT INGUINAL HERNIA REPAIR: ICD-10-CM

## 2022-12-09 DIAGNOSIS — R10.32 LEFT INGUINAL PAIN: ICD-10-CM

## 2022-12-09 DIAGNOSIS — Z98.890 HISTORY OF LEFT INGUINAL HERNIA REPAIR: ICD-10-CM

## 2022-12-09 LAB
CREAT SERPL-MCNC: 1 MG/DL (ref 0.5–1.4)
SAMPLE: NORMAL

## 2022-12-09 PROCEDURE — 25500020 PHARM REV CODE 255: Mod: PO | Performed by: SURGERY

## 2022-12-09 PROCEDURE — 72193 CT PELVIS W/DYE: CPT | Mod: TC,PO

## 2022-12-09 PROCEDURE — 82565 ASSAY OF CREATININE: CPT | Mod: PO

## 2022-12-09 RX ADMIN — IOHEXOL 100 ML: 350 INJECTION, SOLUTION INTRAVENOUS at 11:12

## 2023-01-25 ENCOUNTER — TELEPHONE (OUTPATIENT)
Dept: FAMILY MEDICINE | Facility: CLINIC | Age: 68
End: 2023-01-25

## 2023-02-04 LAB
ALBUMIN SERPL-MCNC: 4.3 G/DL (ref 3.6–5.1)
ALBUMIN/CREAT UR: 4 MCG/MG CREAT
ALBUMIN/GLOB SERPL: 1.7 (CALC) (ref 1–2.5)
ALP SERPL-CCNC: 109 U/L (ref 35–144)
ALT SERPL-CCNC: 38 U/L (ref 9–46)
APPEARANCE UR: CLEAR
AST SERPL-CCNC: 29 U/L (ref 10–35)
BACTERIA #/AREA URNS HPF: NORMAL /HPF
BACTERIA UR CULT: NORMAL
BASOPHILS # BLD AUTO: 28 CELLS/UL (ref 0–200)
BASOPHILS NFR BLD AUTO: 0.5 %
BILIRUB SERPL-MCNC: 0.8 MG/DL (ref 0.2–1.2)
BILIRUB UR QL STRIP: NEGATIVE
BUN SERPL-MCNC: 22 MG/DL (ref 7–25)
BUN/CREAT SERPL: NORMAL (CALC) (ref 6–22)
CALCIUM SERPL-MCNC: 9.4 MG/DL (ref 8.6–10.3)
CHLORIDE SERPL-SCNC: 104 MMOL/L (ref 98–110)
CHOLEST SERPL-MCNC: 105 MG/DL
CHOLEST/HDLC SERPL: 2.1 (CALC)
CO2 SERPL-SCNC: 28 MMOL/L (ref 20–32)
COLOR UR: YELLOW
CREAT SERPL-MCNC: 0.88 MG/DL (ref 0.7–1.35)
CREAT UR-MCNC: 56 MG/DL (ref 20–320)
EGFR: 94 ML/MIN/1.73M2
EOSINOPHIL # BLD AUTO: 220 CELLS/UL (ref 15–500)
EOSINOPHIL NFR BLD AUTO: 4 %
ERYTHROCYTE [DISTWIDTH] IN BLOOD BY AUTOMATED COUNT: 12.6 % (ref 11–15)
GLOBULIN SER CALC-MCNC: 2.6 G/DL (CALC) (ref 1.9–3.7)
GLUCOSE SERPL-MCNC: 94 MG/DL (ref 65–99)
GLUCOSE UR QL STRIP: NEGATIVE
HCT VFR BLD AUTO: 45.9 % (ref 38.5–50)
HDLC SERPL-MCNC: 50 MG/DL
HGB BLD-MCNC: 15.7 G/DL (ref 13.2–17.1)
HGB UR QL STRIP: NEGATIVE
HYALINE CASTS #/AREA URNS LPF: NORMAL /LPF
KETONES UR QL STRIP: NEGATIVE
LDLC SERPL CALC-MCNC: 37 MG/DL (CALC)
LEUKOCYTE ESTERASE UR QL STRIP: NEGATIVE
LYMPHOCYTES # BLD AUTO: 1843 CELLS/UL (ref 850–3900)
LYMPHOCYTES NFR BLD AUTO: 33.5 %
MCH RBC QN AUTO: 29.2 PG (ref 27–33)
MCHC RBC AUTO-ENTMCNC: 34.2 G/DL (ref 32–36)
MCV RBC AUTO: 85.3 FL (ref 80–100)
MICROALBUMIN UR-MCNC: 0.2 MG/DL
MONOCYTES # BLD AUTO: 550 CELLS/UL (ref 200–950)
MONOCYTES NFR BLD AUTO: 10 %
NEUTROPHILS # BLD AUTO: 2860 CELLS/UL (ref 1500–7800)
NEUTROPHILS NFR BLD AUTO: 52 %
NITRITE UR QL STRIP: NEGATIVE
NONHDLC SERPL-MCNC: 55 MG/DL (CALC)
PH UR STRIP: 5.5 [PH] (ref 5–8)
PLATELET # BLD AUTO: 226 THOUSAND/UL (ref 140–400)
PMV BLD REES-ECKER: 10.6 FL (ref 7.5–12.5)
POTASSIUM SERPL-SCNC: 4.6 MMOL/L (ref 3.5–5.3)
PROT SERPL-MCNC: 6.9 G/DL (ref 6.1–8.1)
PROT UR QL STRIP: NEGATIVE
PSA SERPL-MCNC: 1.83 NG/ML
RBC # BLD AUTO: 5.38 MILLION/UL (ref 4.2–5.8)
RBC #/AREA URNS HPF: NORMAL /HPF
SERVICE CMNT-IMP: NORMAL
SODIUM SERPL-SCNC: 138 MMOL/L (ref 135–146)
SP GR UR STRIP: 1.01 (ref 1–1.03)
SQUAMOUS #/AREA URNS HPF: NORMAL /HPF
TRIGL SERPL-MCNC: 97 MG/DL
TSH SERPL-ACNC: 1.23 MIU/L (ref 0.4–4.5)
WBC # BLD AUTO: 5.5 THOUSAND/UL (ref 3.8–10.8)
WBC #/AREA URNS HPF: NORMAL /HPF

## 2023-02-08 ENCOUNTER — OFFICE VISIT (OUTPATIENT)
Dept: FAMILY MEDICINE | Facility: CLINIC | Age: 68
End: 2023-02-08
Payer: MEDICARE

## 2023-02-08 VITALS
SYSTOLIC BLOOD PRESSURE: 120 MMHG | HEART RATE: 64 BPM | BODY MASS INDEX: 25.27 KG/M2 | DIASTOLIC BLOOD PRESSURE: 62 MMHG | WEIGHT: 161 LBS | HEIGHT: 67 IN

## 2023-02-08 DIAGNOSIS — I10 ESSENTIAL HYPERTENSION: Primary | ICD-10-CM

## 2023-02-08 DIAGNOSIS — R91.1 PULMONARY NODULE: ICD-10-CM

## 2023-02-08 DIAGNOSIS — E78.2 MIXED HYPERLIPIDEMIA: ICD-10-CM

## 2023-02-08 PROCEDURE — 99214 OFFICE O/P EST MOD 30 MIN: CPT | Mod: S$GLB,,, | Performed by: NURSE PRACTITIONER

## 2023-02-08 PROCEDURE — 99214 PR OFFICE/OUTPT VISIT, EST, LEVL IV, 30-39 MIN: ICD-10-PCS | Mod: S$GLB,,, | Performed by: NURSE PRACTITIONER

## 2023-02-08 RX ORDER — LOSARTAN POTASSIUM 100 MG/1
100 TABLET ORAL DAILY
Qty: 90 TABLET | Refills: 3 | Status: SHIPPED | OUTPATIENT
Start: 2023-02-08 | End: 2023-02-08

## 2023-02-08 RX ORDER — ATORVASTATIN CALCIUM 20 MG/1
20 TABLET, FILM COATED ORAL DAILY
Qty: 90 TABLET | Refills: 3 | Status: SHIPPED | OUTPATIENT
Start: 2023-02-08 | End: 2023-02-08

## 2023-02-08 RX ORDER — ATORVASTATIN CALCIUM 20 MG/1
20 TABLET, FILM COATED ORAL DAILY
Qty: 90 TABLET | Refills: 3 | Status: SHIPPED | OUTPATIENT
Start: 2023-02-08 | End: 2024-02-07 | Stop reason: SDUPTHER

## 2023-02-08 RX ORDER — LOSARTAN POTASSIUM 100 MG/1
100 TABLET ORAL DAILY
Qty: 90 TABLET | Refills: 3 | Status: SHIPPED | OUTPATIENT
Start: 2023-02-08 | End: 2024-02-07 | Stop reason: SDUPTHER

## 2023-02-08 NOTE — PROGRESS NOTES
SUBJECTIVE:    Patient ID: Myron Iqbal is a 67 y.o. male.    Chief Complaint: Hypertension (Lipida, went over meds verbally// SW)    Patient here for regular follow-up-hypertension/lipids.      Patient reports overall he has been doing well.  No complaints today.    Reports was having pain across lower back and then left inguinal area in November after cutting up a tree. Was worried about hernia so saw surgeon and had pelvic CT which was negative. Back and inguinal pain resolved after about 10 days    Previously saw Dr. Dobbs, pulmonary for abnormal CT scan of the chest- had repeat CT scan in November and told it was stable, repeat 6 months      Hospital Outpatient Visit on 2022   Component Date Value Ref Range Status    POC Creatinine 2022 1.0  0.5 - 1.4 mg/dL Final    Sample 2022 VENOUS   Final       Past Medical History:   Diagnosis Date    Hypertension     Kidney stone     Mixed hyperlipidemia      Past Surgical History:   Procedure Laterality Date    FRACTURE SURGERY  3/2013    [facility]  syndrone.  Ulna reduction surgery    HERNIA REPAIR      neck fusion      C5-C6    SPINE SURGERY  2012    Fusion of c5  & c6    ulnar reduction Right      Family History   Problem Relation Age of Onset    Cancer Father         Colon cancer    Hypertension Father     Cancer Mother         Colon cancer    Diabetes Mother     Hypertension Mother     Cancer Brother         Stage 4 Melanoma    Diabetes Brother          of heart attach because of diabetes    Heart disease Brother        The 10-year CVD risk score (D'Agostino, et al., 2008) is: 12%    Values used to calculate the score:      Age: 67 years      Sex: Male      Diabetic: No      Tobacco smoker: No      Systolic Blood Pressure: 120 mmHg      Is BP treated: Yes      HDL Cholesterol: 50 mg/dL      Total Cholesterol: 105 mg/dL     Marital Status: Single  Alcohol History:  reports current alcohol use of about 2.0 standard drinks per week.  Tobacco  History:  reports that he has never smoked. He has never used smokeless tobacco.  Drug History:  reports no history of drug use.    Health Maintenance Topics with due status: Not Due       Topic Last Completion Date    Colorectal Cancer Screening 08/27/2020    TETANUS VACCINE 02/05/2022    Lipid Panel 02/03/2023     Immunization History   Administered Date(s) Administered    COVID-19, MRNA, LN-S, PF (MODERNA FULL 0.5 ML DOSE) 03/05/2021, 04/11/2021    COVID-19, MRNA, LN-S, PF (Pfizer) (Purple Cap) 10/28/2021    COVID-19, mRNA, LNP-S, bivalent booster, PF (PFIZER OMICRON) 09/16/2022    Influenza 12/29/2014, 01/04/2016, 09/15/2020    Influenza (FLUAD) - Quadrivalent - Adjuvanted - PF *Preferred* (65+) 09/01/2020    Influenza (FLUAD) - Trivalent - Adjuvanted - PF (65+) 09/23/2019    Influenza - High Dose - PF (65 years and older) 09/23/2021    Influenza - Quadrivalent - High Dose - PF (65 years and older) 10/01/2022    Influenza - Quadrivalent - PF *Preferred* (6 months and older) 10/02/2017, 12/09/2018    Pneumococcal Conjugate - 13 Valent 08/15/2019    Pneumococcal Polysaccharide - 23 Valent 09/01/2020    Td (ADULT) 05/03/2018    Tdap 02/05/2022    Zoster 04/20/2017       Review of patient's allergies indicates:  No Known Allergies    Current Outpatient Medications:     glucosamine-D3-Boswellia serr 1,500-400-100 mg-unit-mg Tab, Take by mouth., Disp: , Rfl:     multivitamin-iron-folic acid (CENTRUM) Tab, , Disp: , Rfl:     turmeric 400 mg Cap, , Disp: , Rfl:     atorvastatin (LIPITOR) 20 MG tablet, Take 1 tablet (20 mg total) by mouth once daily. For cholesterol, Disp: 90 tablet, Rfl: 3    losartan (COZAAR) 100 MG tablet, Take 1 tablet (100 mg total) by mouth once daily., Disp: 90 tablet, Rfl: 3    Review of Systems   Constitutional:  Negative for appetite change, chills, fever and unexpected weight change.   HENT:  Negative for sore throat and trouble swallowing.    Eyes:  Negative for visual disturbance.  "  Respiratory:  Negative for cough, shortness of breath and wheezing.    Cardiovascular:  Negative for chest pain, palpitations and leg swelling.   Gastrointestinal:  Negative for abdominal pain, constipation and diarrhea.   Genitourinary:  Negative for dysuria, frequency and hematuria.   Musculoskeletal:  Negative for back pain and gait problem.   Skin:  Negative for rash.   Neurological:  Negative for dizziness, syncope, speech difficulty, numbness and headaches.   Psychiatric/Behavioral:  Negative for dysphoric mood. The patient is not nervous/anxious.         Objective:      Vitals:    02/08/23 0839   BP: 120/62   Pulse: 64   Weight: 73 kg (161 lb)   Height: 5' 7" (1.702 m)     Physical Exam  Vitals and nursing note reviewed.   Constitutional:       General: He is not in acute distress.     Appearance: Normal appearance. He is well-developed and normal weight.   HENT:      Head: Normocephalic and atraumatic.      Right Ear: Tympanic membrane and ear canal normal.      Left Ear: Tympanic membrane and ear canal normal.   Neck:      Vascular: No carotid bruit.   Cardiovascular:      Rate and Rhythm: Normal rate and regular rhythm.      Heart sounds: No murmur heard.    No friction rub. No gallop.   Pulmonary:      Effort: Pulmonary effort is normal. No respiratory distress.      Breath sounds: Normal breath sounds. No wheezing or rales.   Abdominal:      General: There is no distension.      Palpations: Abdomen is soft.      Tenderness: There is no abdominal tenderness.   Musculoskeletal:      Cervical back: Neck supple.      Right lower leg: No edema.      Left lower leg: No edema.   Lymphadenopathy:      Cervical: No cervical adenopathy.   Skin:     General: Skin is warm and dry.      Findings: No rash.   Neurological:      General: No focal deficit present.      Mental Status: He is alert and oriented to person, place, and time.      Gait: Gait normal.   Psychiatric:         Mood and Affect: Mood normal.     "     Assessment:       1. Essential hypertension    2. Mixed hyperlipidemia    3. Pulmonary nodule           Plan:       1. Essential hypertension  Comments:  BP well controlled  Orders:  -     losartan (COZAAR) 100 MG tablet; Take 1 tablet (100 mg total) by mouth once daily.  Dispense: 90 tablet; Refill: 3    2. Mixed hyperlipidemia  Comments:  Reviewed recent labs with patient, well controlled  Orders:  -     atorvastatin (LIPITOR) 20 MG tablet; Take 1 tablet (20 mg total) by mouth once daily. For cholesterol  Dispense: 90 tablet; Refill: 3    3. Pulmonary nodule  Comments:  Stable on recent CT scan      Follow up in about 6 months (around 8/8/2023) for HTN.          Counseled on age and gender appropriate medical preventative services, including cancer screenings, immunizations, overall nutritional health, need for a consistent exercise regimen and an overall push towards maintaining a vigorous and active lifestyle.      2/8/2023 Pamela Medina NP

## 2023-05-15 ENCOUNTER — HOSPITAL ENCOUNTER (OUTPATIENT)
Dept: RADIOLOGY | Facility: HOSPITAL | Age: 68
Discharge: HOME | End: 2023-05-15
Attending: INTERNAL MEDICINE
Payer: MEDICARE

## 2023-05-15 DIAGNOSIS — R93.89 ABNORMAL CT OF THE CHEST: ICD-10-CM

## 2023-05-15 PROCEDURE — 71250 CT THORAX DX C-: CPT | Mod: TC,PO

## 2024-01-11 DIAGNOSIS — Z00.00 ENCOUNTER FOR MEDICARE ANNUAL WELLNESS EXAM: ICD-10-CM

## 2024-01-24 ENCOUNTER — TELEPHONE (OUTPATIENT)
Dept: FAMILY MEDICINE | Facility: CLINIC | Age: 69
End: 2024-01-24
Payer: MEDICARE

## 2024-01-24 DIAGNOSIS — E78.2 MIXED HYPERLIPIDEMIA: ICD-10-CM

## 2024-01-24 DIAGNOSIS — Z00.00 ANNUAL PHYSICAL EXAM: ICD-10-CM

## 2024-01-24 DIAGNOSIS — Z12.5 PROSTATE CANCER SCREENING: ICD-10-CM

## 2024-01-24 DIAGNOSIS — I10 ESSENTIAL HYPERTENSION: Primary | ICD-10-CM

## 2024-02-06 LAB
ALBUMIN SERPL-MCNC: 4.6 G/DL (ref 3.6–5.1)
ALBUMIN/CREAT UR: NORMAL MCG/MG CREAT
ALBUMIN/GLOB SERPL: 1.6 (CALC) (ref 1–2.5)
ALP SERPL-CCNC: 166 U/L (ref 35–144)
ALT SERPL-CCNC: 44 U/L (ref 9–46)
APPEARANCE UR: CLEAR
AST SERPL-CCNC: 37 U/L (ref 10–35)
BACTERIA #/AREA URNS HPF: NORMAL /HPF
BACTERIA UR CULT: NORMAL
BASOPHILS # BLD AUTO: 30 CELLS/UL (ref 0–200)
BASOPHILS NFR BLD AUTO: 0.5 %
BILIRUB SERPL-MCNC: 0.9 MG/DL (ref 0.2–1.2)
BILIRUB UR QL STRIP: NEGATIVE
BUN SERPL-MCNC: 22 MG/DL (ref 7–25)
BUN/CREAT SERPL: ABNORMAL (CALC) (ref 6–22)
CALCIUM SERPL-MCNC: 10 MG/DL (ref 8.6–10.3)
CHLORIDE SERPL-SCNC: 104 MMOL/L (ref 98–110)
CHOLEST SERPL-MCNC: 135 MG/DL
CHOLEST/HDLC SERPL: 2.5 (CALC)
CO2 SERPL-SCNC: 27 MMOL/L (ref 20–32)
COLOR UR: YELLOW
CREAT SERPL-MCNC: 0.82 MG/DL (ref 0.7–1.35)
CREAT UR-MCNC: 37 MG/DL (ref 20–320)
EGFR: 95 ML/MIN/1.73M2
EOSINOPHIL # BLD AUTO: 401 CELLS/UL (ref 15–500)
EOSINOPHIL NFR BLD AUTO: 6.8 %
ERYTHROCYTE [DISTWIDTH] IN BLOOD BY AUTOMATED COUNT: 12.5 % (ref 11–15)
GLOBULIN SER CALC-MCNC: 2.9 G/DL (CALC) (ref 1.9–3.7)
GLUCOSE SERPL-MCNC: 88 MG/DL (ref 65–99)
GLUCOSE UR QL STRIP: NEGATIVE
HCT VFR BLD AUTO: 47.7 % (ref 38.5–50)
HDLC SERPL-MCNC: 55 MG/DL
HGB BLD-MCNC: 16 G/DL (ref 13.2–17.1)
HGB UR QL STRIP: NEGATIVE
HYALINE CASTS #/AREA URNS LPF: NORMAL /LPF
KETONES UR QL STRIP: NEGATIVE
LDLC SERPL CALC-MCNC: 62 MG/DL (CALC)
LEUKOCYTE ESTERASE UR QL STRIP: NEGATIVE
LYMPHOCYTES # BLD AUTO: 1906 CELLS/UL (ref 850–3900)
LYMPHOCYTES NFR BLD AUTO: 32.3 %
MCH RBC QN AUTO: 29.8 PG (ref 27–33)
MCHC RBC AUTO-ENTMCNC: 33.5 G/DL (ref 32–36)
MCV RBC AUTO: 88.8 FL (ref 80–100)
MICROALBUMIN UR-MCNC: <0.2 MG/DL
MONOCYTES # BLD AUTO: 696 CELLS/UL (ref 200–950)
MONOCYTES NFR BLD AUTO: 11.8 %
NEUTROPHILS # BLD AUTO: 2867 CELLS/UL (ref 1500–7800)
NEUTROPHILS NFR BLD AUTO: 48.6 %
NITRITE UR QL STRIP: NEGATIVE
NONHDLC SERPL-MCNC: 80 MG/DL (CALC)
PH UR STRIP: 6 [PH] (ref 5–8)
PLATELET # BLD AUTO: 231 THOUSAND/UL (ref 140–400)
PMV BLD REES-ECKER: 10.8 FL (ref 7.5–12.5)
POTASSIUM SERPL-SCNC: 5.4 MMOL/L (ref 3.5–5.3)
PROT SERPL-MCNC: 7.5 G/DL (ref 6.1–8.1)
PROT UR QL STRIP: NEGATIVE
PSA SERPL-MCNC: 2.69 NG/ML
RBC # BLD AUTO: 5.37 MILLION/UL (ref 4.2–5.8)
RBC #/AREA URNS HPF: NORMAL /HPF
SERVICE CMNT-IMP: NORMAL
SODIUM SERPL-SCNC: 139 MMOL/L (ref 135–146)
SP GR UR STRIP: 1.01 (ref 1–1.03)
SQUAMOUS #/AREA URNS HPF: NORMAL /HPF
TRIGL SERPL-MCNC: 98 MG/DL
TSH SERPL-ACNC: 1.63 MIU/L (ref 0.4–4.5)
WBC # BLD AUTO: 5.9 THOUSAND/UL (ref 3.8–10.8)
WBC #/AREA URNS HPF: NORMAL /HPF

## 2024-02-07 ENCOUNTER — OFFICE VISIT (OUTPATIENT)
Dept: FAMILY MEDICINE | Facility: CLINIC | Age: 69
End: 2024-02-07
Payer: MEDICARE

## 2024-02-07 VITALS
DIASTOLIC BLOOD PRESSURE: 62 MMHG | WEIGHT: 158.81 LBS | SYSTOLIC BLOOD PRESSURE: 110 MMHG | HEART RATE: 67 BPM | HEIGHT: 67 IN | OXYGEN SATURATION: 99 % | BODY MASS INDEX: 24.92 KG/M2

## 2024-02-07 DIAGNOSIS — I10 ESSENTIAL HYPERTENSION: Primary | ICD-10-CM

## 2024-02-07 DIAGNOSIS — R91.1 PULMONARY NODULE: ICD-10-CM

## 2024-02-07 DIAGNOSIS — E78.2 MIXED HYPERLIPIDEMIA: ICD-10-CM

## 2024-02-07 PROCEDURE — 99214 OFFICE O/P EST MOD 30 MIN: CPT | Mod: S$GLB,,, | Performed by: NURSE PRACTITIONER

## 2024-02-07 RX ORDER — ATORVASTATIN CALCIUM 20 MG/1
20 TABLET, FILM COATED ORAL DAILY
Qty: 90 TABLET | Refills: 3 | Status: SHIPPED | OUTPATIENT
Start: 2024-02-07

## 2024-02-07 RX ORDER — LOSARTAN POTASSIUM 100 MG/1
100 TABLET ORAL DAILY
Qty: 90 TABLET | Refills: 3 | Status: SHIPPED | OUTPATIENT
Start: 2024-02-07

## 2024-02-07 NOTE — PROGRESS NOTES
SUBJECTIVE:    Patient ID: Myron Iqbal is a 68 y.o. male.    Chief Complaint: Annual Exam (No bottles//Pt is here for a check up and medication refills//ERIC )    Patient here for annual physical- follow-up hypertension/lipids.      Pt reports overall has been doing well. No c/o's today. Travels to [place] fairly regularly to stay with his  and adopted sons.    saw Dr. Dobbs, pulmonary last year for abnormal CT scan of the chest- had repeat CT scan in May 2023 and told it was stable, repeat 1 year. Pt denies cough, SOB or wheezing    Pt reports his 73 y/o brother is in nursing home with progressive parkinsons- thinks his mother may have also had PD. Pt denies tremor or gait issues, no falls    Walks 2-3 miles a couple days a week for exercise        Telephone on 01/24/2024   Component Date Value Ref Range Status    WBC 02/05/2024 5.9  3.8 - 10.8 Thousand/uL Final    RBC 02/05/2024 5.37  4.20 - 5.80 Million/uL Final    Hemoglobin 02/05/2024 16.0  13.2 - 17.1 g/dL Final    Hematocrit 02/05/2024 47.7  38.5 - 50.0 % Final    MCV 02/05/2024 88.8  80.0 - 100.0 fL Final    MCH 02/05/2024 29.8  27.0 - 33.0 pg Final    MCHC 02/05/2024 33.5  32.0 - 36.0 g/dL Final    RDW 02/05/2024 12.5  11.0 - 15.0 % Final    Platelets 02/05/2024 231  140 - 400 Thousand/uL Final    MPV 02/05/2024 10.8  7.5 - 12.5 fL Final    Neutrophils, Abs 02/05/2024 2,867  1,500 - 7,800 cells/uL Final    Lymph # 02/05/2024 1,906  850 - 3,900 cells/uL Final    Mono # 02/05/2024 696  200 - 950 cells/uL Final    Eos # 02/05/2024 401  15 - 500 cells/uL Final    Baso # 02/05/2024 30  0 - 200 cells/uL Final    Neutrophils Relative 02/05/2024 48.6  % Final    Lymph % 02/05/2024 32.3  % Final    Mono % 02/05/2024 11.8  % Final    Eosinophil % 02/05/2024 6.8  % Final    Basophil % 02/05/2024 0.5  % Final    Glucose 02/05/2024 88  65 - 99 mg/dL Final    BUN 02/05/2024 22  7 - 25 mg/dL Final    Creatinine 02/05/2024 0.82  0.70 - 1.35 mg/dL Final     eGFR 02/05/2024 95  > OR = 60 mL/min/1.73m2 Final    BUN/Creatinine Ratio 02/05/2024 SEE NOTE:  6 - 22 (calc) Final    Sodium 02/05/2024 139  135 - 146 mmol/L Final    Potassium 02/05/2024 5.4 (H)  3.5 - 5.3 mmol/L Final    Chloride 02/05/2024 104  98 - 110 mmol/L Final    CO2 02/05/2024 27  20 - 32 mmol/L Final    Calcium 02/05/2024 10.0  8.6 - 10.3 mg/dL Final    Total Protein 02/05/2024 7.5  6.1 - 8.1 g/dL Final    Albumin 02/05/2024 4.6  3.6 - 5.1 g/dL Final    Globulin, Total 02/05/2024 2.9  1.9 - 3.7 g/dL (calc) Final    Albumin/Globulin Ratio 02/05/2024 1.6  1.0 - 2.5 (calc) Final    Total Bilirubin 02/05/2024 0.9  0.2 - 1.2 mg/dL Final    Alkaline Phosphatase 02/05/2024 166 (H)  35 - 144 U/L Final    AST 02/05/2024 37 (H)  10 - 35 U/L Final    ALT 02/05/2024 44  9 - 46 U/L Final    Cholesterol 02/05/2024 135  <200 mg/dL Final    HDL 02/05/2024 55  > OR = 40 mg/dL Final    Triglycerides 02/05/2024 98  <150 mg/dL Final    LDL Cholesterol 02/05/2024 62  mg/dL (calc) Final    HDL/Cholesterol Ratio 02/05/2024 2.5  <5.0 (calc) Final    Non HDL Chol. (LDL+VLDL) 02/05/2024 80  <130 mg/dL (calc) Final    Creatinine, Urine 02/05/2024 37  20 - 320 mg/dL Final    Microalb, Ur 02/05/2024 <0.2  See Note: mg/dL Final    Microalb/Creat Ratio 02/05/2024 NOTE  <30 mcg/mg creat Final    PROSTATE SPECIFIC ANTIGEN, SCR - Q* 02/05/2024 2.69  < OR = 4.00 ng/mL Final    Color, UA 02/05/2024 YELLOW  YELLOW Final    Appearance, UA 02/05/2024 CLEAR  CLEAR Final    Specific Gravity, UA 02/05/2024 1.008  1.001 - 1.035 Final    pH, UA 02/05/2024 6.0  5.0 - 8.0 Final    Glucose, UA 02/05/2024 NEGATIVE  NEGATIVE Final    Bilirubin, UA 02/05/2024 NEGATIVE  NEGATIVE Final    Ketones, UA 02/05/2024 NEGATIVE  NEGATIVE Final    Occult Blood UA 02/05/2024 NEGATIVE  NEGATIVE Final    Protein, UA 02/05/2024 NEGATIVE  NEGATIVE Final    Nitrite, UA 02/05/2024 NEGATIVE  NEGATIVE Final    Leukocytes, UA 02/05/2024 NEGATIVE  NEGATIVE Final    WBC  Casts, UA 2024 NONE SEEN  < OR = 5 /HPF Final    RBC Casts, UA 2024 NONE SEEN  < OR = 2 /HPF Final    Squam Epithel, UA 2024 NONE SEEN  < OR = 5 /HPF Final    Bacteria, UA 2024 NONE SEEN  NONE SEEN /HPF Final    Hyaline Casts, UA 2024 NONE SEEN  NONE SEEN /LPF Final    Service Cmt: 2024    Final    Reflexive Urine Culture 2024    Final    TSH w/reflex to FT4 2024 1.63  0.40 - 4.50 mIU/L Final       Past Medical History:   Diagnosis Date    Hypertension     Kidney stone     Mixed hyperlipidemia      Past Surgical History:   Procedure Laterality Date    FRACTURE SURGERY  2013    [facility]  syndrone.  Ulna reduction surgery    HERNIA REPAIR      neck fusion      C5-C6    SPINE SURGERY  2012    Fusion of c5  & c6    ulnar reduction Right      Family History   Problem Relation Age of Onset    Cancer Father         Colon cancer    Hypertension Father     Cancer Mother         Colon cancer    Diabetes Mother     Hypertension Mother     Cancer Brother         Stage 4 Melanoma    Diabetes Brother          of heart attach because of diabetes    Heart disease Brother        All of your core healthy metrics are met.      The 10-year CVD risk score (D'Agostino, et al., 2008) is: 12.7%    Values used to calculate the score:      Age: 68 years      Sex: Male      Diabetic: No      Tobacco smoker: No      Systolic Blood Pressure: 110 mmHg      Is BP treated: Yes      HDL Cholesterol: 55 mg/dL      Total Cholesterol: 135 mg/dL     Marital Status: Single  Alcohol History:  reports current alcohol use of about 2.0 standard drinks of alcohol per week.  Tobacco History:  reports that he has never smoked. He has never used smokeless tobacco.  Drug History:  reports no history of drug use.    Health Maintenance Topics with due status: Not Due       Topic Last Completion Date    Colorectal Cancer Screening 2020    TETANUS VACCINE 2022    Lipid Panel 2024      Immunization History   Administered Date(s) Administered    COVID-19 Vaccine 03/05/2021, 04/11/2021    COVID-19, MRNA, LN-S, PF (MODERNA FULL 0.5 ML DOSE) 03/05/2021, 04/11/2021    COVID-19, MRNA, LN-S, PF (Pfizer) (Gray Cap) 09/23/2023    COVID-19, MRNA, LN-S, PF (Pfizer) (Purple Cap) 10/28/2021    COVID-19, mRNA, LNP-S, PF, andreina-sucrose, 30 mcg/0.3 mL (Pfizer 2023 Ages 12+) 09/23/2023    COVID-19, mRNA, LNP-S, bivalent booster, PF (PFIZER OMICRON) 09/16/2022    Influenza 12/29/2014, 01/04/2016, 09/15/2020    Influenza (FLUAD) - Quadrivalent - Adjuvanted - PF *Preferred* (65+) 09/01/2020, 11/06/2023    Influenza (FLUAD) - Trivalent - Adjuvanted - PF (65+) 09/23/2019    Influenza - High Dose - PF (65 years and older) 09/23/2021    Influenza - Quadrivalent - High Dose - PF (65 years and older) 10/01/2022    Influenza - Quadrivalent - PF *Preferred* (6 months and older) 10/02/2017, 12/09/2018    Pneumococcal Conjugate - 13 Valent 08/15/2019    Pneumococcal Polysaccharide - 23 Valent 09/01/2020    Td (ADULT) 05/03/2018    Td - PF (ADULT) 05/03/2018    Tdap 02/05/2022    Zoster 04/20/2017       Review of patient's allergies indicates:  No Known Allergies    Current Outpatient Medications:     glucosamine-D3-Boswellia serr 1,500-400-100 mg-unit-mg Tab, Take by mouth., Disp: , Rfl:     multivitamin-iron-folic acid (CENTRUM) Tab, , Disp: , Rfl:     turmeric 400 mg Cap, , Disp: , Rfl:     atorvastatin (LIPITOR) 20 MG tablet, Take 1 tablet (20 mg total) by mouth once daily. For cholesterol, Disp: 90 tablet, Rfl: 3    losartan (COZAAR) 100 MG tablet, Take 1 tablet (100 mg total) by mouth once daily., Disp: 90 tablet, Rfl: 3    Review of Systems   Constitutional:  Negative for activity change, appetite change, chills, fever and unexpected weight change.   HENT:  Negative for hearing loss, rhinorrhea, sore throat and trouble swallowing.    Eyes:  Negative for discharge and visual disturbance.   Respiratory:  Negative for  "cough, chest tightness, shortness of breath and wheezing.    Cardiovascular:  Negative for chest pain, palpitations and leg swelling.   Gastrointestinal:  Negative for abdominal pain, blood in stool, constipation, diarrhea and vomiting.   Endocrine: Negative for polydipsia and polyuria.   Genitourinary:  Negative for difficulty urinating, dysuria, frequency, hematuria and urgency.   Musculoskeletal:  Negative for arthralgias, back pain, gait problem, joint swelling and neck pain.   Skin:  Negative for rash.   Neurological:  Negative for dizziness, syncope, speech difficulty, weakness, numbness and headaches.   Psychiatric/Behavioral:  Negative for confusion and dysphoric mood. The patient is not nervous/anxious.           Objective:      Vitals:    02/07/24 1525   BP: 110/62   Pulse: 67   SpO2: 99%   Weight: 72 kg (158 lb 12.8 oz)   Height: 5' 7" (1.702 m)     Physical Exam  Vitals and nursing note reviewed.   Constitutional:       General: He is not in acute distress.     Appearance: Normal appearance. He is well-developed and normal weight.   HENT:      Head: Normocephalic and atraumatic.      Right Ear: Tympanic membrane and ear canal normal.      Left Ear: Tympanic membrane and ear canal normal.   Neck:      Vascular: No carotid bruit.   Cardiovascular:      Rate and Rhythm: Normal rate and regular rhythm.      Heart sounds: No murmur heard.     No friction rub. No gallop.   Pulmonary:      Effort: Pulmonary effort is normal. No respiratory distress.      Breath sounds: Normal breath sounds. No wheezing or rales.   Abdominal:      General: There is no distension.      Palpations: Abdomen is soft.      Tenderness: There is no abdominal tenderness.   Musculoskeletal:      Cervical back: Neck supple.      Right lower leg: No edema.      Left lower leg: No edema.   Lymphadenopathy:      Cervical: No cervical adenopathy.   Skin:     General: Skin is warm and dry.      Findings: No rash.   Neurological:      General: " No focal deficit present.      Mental Status: He is alert and oriented to person, place, and time.      Gait: Gait normal.   Psychiatric:         Mood and Affect: Mood normal.           Assessment:       1. Essential hypertension    2. Mixed hyperlipidemia    3. Pulmonary nodule           Plan:       1. Essential hypertension  -BP well controlled. Reviewed recent labs with pt. Potassium just above normal range- denies any potassium supplements or high potassium foods. Offered to repeat lab but pt declines need  -     losartan (COZAAR) 100 MG tablet; Take 1 tablet (100 mg total) by mouth once daily.  Dispense: 90 tablet; Refill: 3    2. Mixed hyperlipidemia  -reviewed recent labs, well controlled  -     atorvastatin (LIPITOR) 20 MG tablet; Take 1 tablet (20 mg total) by mouth once daily. For cholesterol  Dispense: 90 tablet; Refill: 3    3. Pulmonary nodule   -stable on last CT scan    Follow up in about 1 year (around 2/7/2025) for HTN, lipids.          Counseled on age and gender appropriate medical preventative services, including cancer screenings, immunizations, overall nutritional health, need for a consistent exercise regimen and an overall push towards maintaining a vigorous and active lifestyle.      2/7/2024 Pamela Medina NP

## 2024-10-30 ENCOUNTER — PATIENT MESSAGE (OUTPATIENT)
Dept: RADIOLOGY | Facility: HOSPITAL | Age: 69
End: 2024-10-30

## 2024-10-30 DIAGNOSIS — R93.89 ABNORMAL CT OF THE CHEST: Primary | ICD-10-CM

## 2024-11-04 ENCOUNTER — TELEPHONE (OUTPATIENT)
Dept: RADIOLOGY | Facility: HOSPITAL | Age: 69
End: 2024-11-04

## 2024-11-05 ENCOUNTER — HOSPITAL ENCOUNTER (OUTPATIENT)
Dept: RADIOLOGY | Facility: HOSPITAL | Age: 69
Discharge: HOME | End: 2024-11-05
Attending: INTERNAL MEDICINE
Payer: MEDICARE

## 2024-11-05 DIAGNOSIS — R93.89 ABNORMAL CT OF THE CHEST: ICD-10-CM

## 2024-11-05 PROCEDURE — 71250 CT THORAX DX C-: CPT | Mod: TC

## 2024-11-05 PROCEDURE — 71250 CT THORAX DX C-: CPT | Mod: 26,,, | Performed by: RADIOLOGY

## 2024-11-20 ENCOUNTER — OFFICE VISIT (OUTPATIENT)
Dept: PULMONOLOGY | Facility: CLINIC | Age: 69
End: 2024-11-20
Payer: MEDICARE

## 2024-11-20 VITALS — WEIGHT: 159.88 LBS | OXYGEN SATURATION: 95 % | HEART RATE: 62 BPM | BODY MASS INDEX: 25.04 KG/M2

## 2024-11-20 DIAGNOSIS — R93.89 ABNORMAL CT OF THE CHEST: ICD-10-CM

## 2024-11-20 DIAGNOSIS — R91.1 PULMONARY NODULE: Primary | ICD-10-CM

## 2024-11-20 PROCEDURE — 99213 OFFICE O/P EST LOW 20 MIN: CPT | Mod: S$GLB,,, | Performed by: INTERNAL MEDICINE

## 2024-11-20 NOTE — ASSESSMENT & PLAN NOTE
Repeat CT is stable  Will repeat in 1 year and if stable he will need no further workup  RTC 1 year

## 2024-11-20 NOTE — PROGRESS NOTES
Office Visit Note *    Patient Name: Myron Iqbal  MRN: [id number]  : 1955      Reason for visit: abnormal CT chest    HPI:     2022 - 67 yo male with no prior pulmonary history had kidney stone and had CT which showed a small nodule min RML and a few inflammatory changes.  Repeat CT scan was stable (see report).  He has no respiratory symptoms.  He has never been a smoker and has no known toxic exposures.  There is a family h/o melanoma and colon cancer.    2024 - here for follow up, reviewed most recent Ct scan and compared to priors.  There is no significant change and he has no respiratory symptoms.      Past Medical History    Past Medical History:   Diagnosis Date    Hypertension     Kidney stone     Mixed hyperlipidemia        Past Surgical History    Past Surgical History:   Procedure Laterality Date    FRACTURE SURGERY  2013    [facility]  syndrone.  Ulna reduction surgery    HERNIA REPAIR      neck fusion      C5-C6    SPINE SURGERY  2012    Fusion of c5  & c6    ulnar reduction Right        Medications      Current Outpatient Medications:     atorvastatin (LIPITOR) 20 MG tablet, Take 1 tablet (20 mg total) by mouth once daily. For cholesterol, Disp: 90 tablet, Rfl: 3    glucosamine-D3-Boswellia serr 1,500-400-100 mg-unit-mg Tab, Take by mouth., Disp: , Rfl:     losartan (COZAAR) 100 MG tablet, Take 1 tablet (100 mg total) by mouth once daily., Disp: 90 tablet, Rfl: 3    multivitamin-iron-folic acid (CENTRUM) Tab, , Disp: , Rfl:     turmeric 400 mg Cap, , Disp: , Rfl:     Allergies    Review of patient's allergies indicates:  No Known Allergies    SocHx    Social History     Tobacco Use   Smoking Status Never   Smokeless Tobacco Never       Social History     Substance and Sexual Activity   Alcohol Use Yes    Alcohol/week: 2.0 standard drinks of alcohol    Types: 2 Glasses of wine per week    Comment: socially       Drug Use - no  Occupation - did lab work  Asbestos exposure - no  Pets -  dog    FMHx    Family History   Problem Relation Name Age of Onset    Cancer Father Bhupinder Iqbal Sr         Colon cancer    Hypertension Father Bhupinder Iqbal Sr     Cancer Mother Leatha Iqbal         Colon cancer    Diabetes Mother Leatha Iqbal     Hypertension Mother Leatha Iqbal     Cancer Brother Bhupinder Iqbal Jr         Stage 4 Melanoma    Diabetes Brother Ángel Iqbal          of heart attach because of diabetes    Heart disease Brother Ángel Iqbal          Review of Systems  Review of Systems   Constitutional:  Negative for chills, diaphoresis, fever, malaise/fatigue and weight loss.   HENT:  Negative for congestion.    Eyes:  Negative for pain.   Respiratory:  Negative for cough, hemoptysis, sputum production, shortness of breath, wheezing and stridor.    Cardiovascular:  Negative for chest pain, palpitations, orthopnea, claudication, leg swelling and PND.   Gastrointestinal:  Negative for abdominal pain, constipation, diarrhea, heartburn, nausea and vomiting.   Genitourinary:  Negative for dysuria, frequency and urgency.   Musculoskeletal:  Negative for falls and myalgias.   Neurological:  Negative for sensory change, focal weakness and weakness.   Psychiatric/Behavioral:  Negative for depression, substance abuse and suicidal ideas. The patient is not nervous/anxious.        Physical Exam    Vitals:    24 1008   BP: (P) 118/70   BP Location: Left arm   Patient Position: Sitting   Pulse: 62   SpO2: 95%   Weight: 72.5 kg (159 lb 14.4 oz)       Physical Exam  Vitals and nursing note reviewed.   Constitutional:       General: He is not in acute distress.     Appearance: He is well-developed. He is not ill-appearing, toxic-appearing or diaphoretic.   HENT:      Head: Normocephalic and atraumatic.      Right Ear: External ear normal.      Left Ear: External ear normal.      Nose: Nose normal.   Eyes:      General: No scleral icterus.        Right eye: No discharge.         Left eye: No  discharge.      Extraocular Movements: Extraocular movements intact.      Conjunctiva/sclera: Conjunctivae normal.      Pupils: Pupils are equal, round, and reactive to light.   Neck:      Thyroid: No thyromegaly.      Vascular: No JVD.      Trachea: No tracheal deviation.   Cardiovascular:      Rate and Rhythm: Normal rate and regular rhythm.      Heart sounds: Normal heart sounds. No murmur heard.     No friction rub. No gallop.   Pulmonary:      Effort: Pulmonary effort is normal. No respiratory distress.      Breath sounds: Normal breath sounds. No stridor. No wheezing, rhonchi or rales.   Chest:      Chest wall: No tenderness.   Abdominal:      General: Bowel sounds are normal. There is no distension.      Palpations: Abdomen is soft.      Tenderness: There is no abdominal tenderness. There is no guarding.   Musculoskeletal:         General: No tenderness. Normal range of motion.      Cervical back: Normal range of motion and neck supple. No rigidity.      Right lower leg: No edema.      Left lower leg: No edema.   Lymphadenopathy:      Cervical: No cervical adenopathy.   Neurological:      General: No focal deficit present.      Mental Status: He is alert and oriented to person, place, and time. Mental status is at baseline.      Cranial Nerves: No cranial nerve deficit.      Motor: No weakness.      Deep Tendon Reflexes: Reflexes are normal and symmetric.   Psychiatric:         Mood and Affect: Mood normal.         Behavior: Behavior normal.         Thought Content: Thought content normal.         Judgment: Judgment normal.         Labs    Lab Results   Component Value Date    WBC 5.9 02/05/2024    HGB 16.0 02/05/2024    HCT 47.7 02/05/2024     02/05/2024       Sodium   Date Value Ref Range Status   02/05/2024 139 135 - 146 mmol/L Final     Potassium   Date Value Ref Range Status   02/05/2024 5.4 (H) 3.5 - 5.3 mmol/L Final     Chloride   Date Value Ref Range Status   02/05/2024 104 98 - 110 mmol/L  Final     CO2   Date Value Ref Range Status   02/05/2024 27 20 - 32 mmol/L Final     Glucose   Date Value Ref Range Status   02/05/2024 88 65 - 99 mg/dL Final     Comment:                   Fasting reference interval          BUN   Date Value Ref Range Status   02/05/2024 22 7 - 25 mg/dL Final     Creatinine   Date Value Ref Range Status   02/05/2024 0.82 0.70 - 1.35 mg/dL Final     Calcium   Date Value Ref Range Status   02/05/2024 10.0 8.6 - 10.3 mg/dL Final     Total Protein   Date Value Ref Range Status   02/05/2024 7.5 6.1 - 8.1 g/dL Final     Albumin   Date Value Ref Range Status   02/05/2024 4.6 3.6 - 5.1 g/dL Final     Total Bilirubin   Date Value Ref Range Status   02/05/2024 0.9 0.2 - 1.2 mg/dL Final     Alkaline Phosphatase   Date Value Ref Range Status   08/03/2020 87 35 - 144 U/L Final     AST   Date Value Ref Range Status   02/05/2024 37 (H) 10 - 35 U/L Final     ALT   Date Value Ref Range Status   02/05/2024 44 9 - 46 U/L Final       Xrays    CMS MANDATED QUALITY DATA - CT RADIATION - 436     All CT scans at this facility utilize dose modulation, iterative reconstruction, and/or weight based dosing when appropriate to reduce radiation dose to as low as reasonably achievable.           Reason: Pulmonary nodule     TECHNIQUE: CT thorax without contrast     COMPARISON: 11/4/2021     CT THORAX:  There are stable reticular nodular opacities within the right middle lobe and lateral right lower lobe. There is stable mucous plugging within the superior right middle lobe.     There is a stable 4 mm fissural nodule along the minor fissure in the right middle lobe.  There are no new pulmonary nodules, infiltrates or pleural effusions. The remainder of the bronchi and trachea are normal.     The heart is normal in size. The aorta is normal in caliber. There is coronary artery calcification. There is no hilar, mediastinal or axillary adenopathy.     The esophagus is normal.     There are multiple low-attenuation  lesions in the liver compatible with cysts. There is calcified gallstone. The adrenal glands are normal. There are no acute osseous abnormalities.     IMPRESSION:  Stable reticular nodular opacities within the right middle lobe and right lower lobe with mucous plugging in the right middle lobe     Stable 4 mm fissural nodule along the minor fissure     Follow-up CT chest in six months is recommended to document stability     Electronically signed by:  Lucila Caceres MD  5/11/2022 9:47 AM CDT Workstation: [id number]    Impression/Plan    Problem List Items Addressed This Visit          Other    Abnormal CT of the chest     Repeat CT is stable  Will repeat in 1 year and if stable he will need no further workup  RTC 1 year          Other Visit Diagnoses       Pulmonary nodule    -  Primary    Relevant Orders    CT Chest Without Contrast                      Cedric Dobbs MD

## 2025-01-29 ENCOUNTER — TELEPHONE (OUTPATIENT)
Dept: FAMILY MEDICINE | Facility: CLINIC | Age: 70
End: 2025-01-29
Payer: MEDICARE

## 2025-01-29 DIAGNOSIS — E78.2 MIXED HYPERLIPIDEMIA: ICD-10-CM

## 2025-01-29 DIAGNOSIS — Z79.899 ENCOUNTER FOR LONG-TERM (CURRENT) USE OF MEDICATIONS: Primary | ICD-10-CM

## 2025-01-29 DIAGNOSIS — I10 ESSENTIAL HYPERTENSION: ICD-10-CM

## 2025-02-10 ENCOUNTER — OFFICE VISIT (OUTPATIENT)
Dept: FAMILY MEDICINE | Facility: CLINIC | Age: 70
End: 2025-02-10
Payer: MEDICARE

## 2025-02-10 ENCOUNTER — HOSPITAL ENCOUNTER (OUTPATIENT)
Dept: RADIOLOGY | Facility: HOSPITAL | Age: 70
Discharge: HOME | End: 2025-02-10
Attending: NURSE PRACTITIONER
Payer: MEDICARE

## 2025-02-10 VITALS
SYSTOLIC BLOOD PRESSURE: 138 MMHG | HEART RATE: 60 BPM | WEIGHT: 162.38 LBS | BODY MASS INDEX: 25.48 KG/M2 | DIASTOLIC BLOOD PRESSURE: 66 MMHG | HEIGHT: 67 IN | OXYGEN SATURATION: 98 %

## 2025-02-10 DIAGNOSIS — Z12.5 PROSTATE CANCER SCREENING: ICD-10-CM

## 2025-02-10 DIAGNOSIS — Z00.00 ANNUAL PHYSICAL EXAM: Primary | ICD-10-CM

## 2025-02-10 DIAGNOSIS — E78.2 MIXED HYPERLIPIDEMIA: ICD-10-CM

## 2025-02-10 DIAGNOSIS — R79.89 ELEVATED LFTS: ICD-10-CM

## 2025-02-10 DIAGNOSIS — R91.1 PULMONARY NODULE: ICD-10-CM

## 2025-02-10 DIAGNOSIS — I10 ESSENTIAL HYPERTENSION: ICD-10-CM

## 2025-02-10 PROCEDURE — 76705 ECHO EXAM OF ABDOMEN: CPT | Mod: 26,,, | Performed by: RADIOLOGY

## 2025-02-10 PROCEDURE — 76705 ECHO EXAM OF ABDOMEN: CPT | Mod: TC

## 2025-02-10 RX ORDER — LOSARTAN POTASSIUM 100 MG/1
100 TABLET ORAL DAILY
Qty: 90 TABLET | Refills: 3 | Status: SHIPPED | OUTPATIENT
Start: 2025-02-10

## 2025-02-10 RX ORDER — ATORVASTATIN CALCIUM 20 MG/1
20 TABLET, FILM COATED ORAL DAILY
Qty: 90 TABLET | Refills: 3 | Status: SHIPPED | OUTPATIENT
Start: 2025-02-10

## 2025-02-10 NOTE — PROGRESS NOTES
SUBJECTIVE:    Patient ID: Myron Iqbal is a 69 y.o. male.    Chief Complaint: Annual Exam (No bottles//Pt is here for his annual exam and medication refills//ERIC )    Patient here for annual physical- follow-up hypertension/lipids.      Pt reports overall he's been doing. Got  over the holidays and plans on moving to [place] soon. Is expecting their first grandchild any day now    Had f/u with Dr. Dobbs, pulmonary in November for pulmonary nodule which is stable on last CT    Still walking for exercise. Admits he enjoys his sweets- wt is up 4lbs since last visit        Telephone on 01/29/2025   Component Date Value Ref Range Status    WBC 02/06/2025 5.9  3.8 - 10.8 Thousand/uL Final    RBC 02/06/2025 5.27  4.20 - 5.80 Million/uL Final    Hemoglobin 02/06/2025 15.4  13.2 - 17.1 g/dL Final    Hematocrit 02/06/2025 47.6  38.5 - 50.0 % Final    MCV 02/06/2025 90.3  80.0 - 100.0 fL Final    MCH 02/06/2025 29.2  27.0 - 33.0 pg Final    MCHC 02/06/2025 32.4  32.0 - 36.0 g/dL Final    RDW 02/06/2025 12.6  11.0 - 15.0 % Final    Platelets 02/06/2025 236  140 - 400 Thousand/uL Final    MPV 02/06/2025 10.3  7.5 - 12.5 fL Final    Neutrophils, Abs 02/06/2025 2,956  1,500 - 7,800 cells/uL Final    Lymph # 02/06/2025 1,988  850 - 3,900 cells/uL Final    Mono # 02/06/2025 614  200 - 950 cells/uL Final    Eos # 02/06/2025 301  15 - 500 cells/uL Final    Baso # 02/06/2025 41  0 - 200 cells/uL Final    Neutrophils Relative 02/06/2025 50.1  % Final    Lymph % 02/06/2025 33.7  % Final    Mono % 02/06/2025 10.4  % Final    Eosinophil % 02/06/2025 5.1  % Final    Basophil % 02/06/2025 0.7  % Final    Glucose 02/06/2025 87  65 - 99 mg/dL Final    BUN 02/06/2025 14  7 - 25 mg/dL Final    Creatinine 02/06/2025 0.73  0.70 - 1.28 mg/dL Final    eGFR 02/06/2025 98  > OR = 60 mL/min/1.73m2 Final    BUN/Creatinine Ratio 02/06/2025 SEE NOTE:  6 - 22 (calc) Final    Sodium 02/06/2025 138  135 - 146 mmol/L Final    Potassium  02/06/2025 4.3  3.5 - 5.3 mmol/L Final    Chloride 02/06/2025 102  98 - 110 mmol/L Final    CO2 02/06/2025 28  20 - 32 mmol/L Final    Calcium 02/06/2025 9.2  8.6 - 10.3 mg/dL Final    Total Protein 02/06/2025 7.1  6.1 - 8.1 g/dL Final    Albumin 02/06/2025 4.4  3.6 - 5.1 g/dL Final    Globulin, Total 02/06/2025 2.7  1.9 - 3.7 g/dL (calc) Final    Albumin/Globulin Ratio 02/06/2025 1.6  1.0 - 2.5 (calc) Final    Total Bilirubin 02/06/2025 0.9  0.2 - 1.2 mg/dL Final    Alkaline Phosphatase 02/06/2025 242 (H)  35 - 144 U/L Final    AST 02/06/2025 33  10 - 35 U/L Final    ALT 02/06/2025 48 (H)  9 - 46 U/L Final    Cholesterol 02/06/2025 153  <200 mg/dL Final    HDL 02/06/2025 55  > OR = 40 mg/dL Final    Triglycerides 02/06/2025 134  <150 mg/dL Final    LDL Cholesterol 02/06/2025 76  mg/dL (calc) Final    HDL/Cholesterol Ratio 02/06/2025 2.8  <5.0 (calc) Final    Non HDL Chol. (LDL+VLDL) 02/06/2025 98  <130 mg/dL (calc) Final    TSH w/reflex to FT4 02/06/2025 1.24  0.40 - 4.50 mIU/L Final    Color, UA 02/06/2025 YELLOW  YELLOW Final    Appearance, UA 02/06/2025 CLEAR  CLEAR Final    Specific Gravity, UA 02/06/2025 1.008  1.001 - 1.035 Final    pH, UA 02/06/2025 6.5  5.0 - 8.0 Final    Glucose, UA 02/06/2025 NEGATIVE  NEGATIVE Final    Bilirubin, UA 02/06/2025 NEGATIVE  NEGATIVE Final    Ketones, UA 02/06/2025 NEGATIVE  NEGATIVE Final    Occult Blood UA 02/06/2025 NEGATIVE  NEGATIVE Final    Protein, UA 02/06/2025 NEGATIVE  NEGATIVE Final    Nitrite, UA 02/06/2025 NEGATIVE  NEGATIVE Final    Leukocytes, UA 02/06/2025 NEGATIVE  NEGATIVE Final    WBC Casts, UA 02/06/2025 NONE SEEN  < OR = 5 /HPF Final    RBC Casts, UA 02/06/2025 NONE SEEN  < OR = 2 /HPF Final    Squam Epithel, UA 02/06/2025 NONE SEEN  < OR = 5 /HPF Final    Bacteria, UA 02/06/2025 NONE SEEN  NONE SEEN /HPF Final    Hyaline Casts, UA 02/06/2025 NONE SEEN  NONE SEEN /LPF Final    Service Cmt: 02/06/2025 SEE COMMENT   Final    Reflexive Urine Culture  2025 SEE COMMENT   Final    Creatinine, Urine 2025 52  20 - 320 mg/dL Final    Microalb, Ur 2025 <0.2  See Note: mg/dL Final    Microalb/Creat Ratio 2025 NOTE  <30 mg/g creat Final       Past Medical History:   Diagnosis Date    Hypertension     Kidney stone     Mixed hyperlipidemia      Past Surgical History:   Procedure Laterality Date    FRACTURE SURGERY  2013    [place]  syndrone.  Ulna reduction surgery    HERNIA REPAIR      neck fusion      C5-C6    SPINE SURGERY  2012    Fusion of c5  & c6    ulnar reduction Right      Family History   Problem Relation Name Age of Onset    Cancer Father Bhupinder Iqbal Sr         Colon cancer    Hypertension Father Bhupinder Iqbal Sr     Cancer Mother Leatha Iqbal         Colon cancer    Diabetes Mother Leatha Iqbal     Hypertension Mother Leatha Iqbal     Cancer Brother Bhupinder Iqbal Jr         Stage 4 Melanoma    Diabetes Brother Ángel Iqbal          of heart attach because of diabetes    Heart disease Brother Ángel Iqbal     Diabetes Brother Tomi Iqbal        Tests to Keep You Healthy    Colon Cancer Screening: Met on 2020  Last Blood Pressure <= 139/89 (2/10/2025): NO      The 10-year CVD risk score (D'Agostino, et al., 2008) is: 22.8%    Values used to calculate the score:      Age: 69 years      Sex: Male      Diabetic: No      Tobacco smoker: No      Systolic Blood Pressure: 138 mmHg      Is BP treated: Yes      HDL Cholesterol: 55 mg/dL      Total Cholesterol: 153 mg/dL     Marital Status: Single  Alcohol History:  reports current alcohol use of about 2.0 standard drinks of alcohol per week.  Tobacco History:  reports that he has never smoked. He has never used smokeless tobacco.  Drug History:  reports no history of drug use.    Health Maintenance Topics with due status: Not Due       Topic Last Completion Date    Colorectal Cancer Screening 2020    TETANUS VACCINE 2022    Lipid Panel 2025      Immunization History   Administered Date(s) Administered    COVID-19 Vaccine 03/05/2021, 04/11/2021    COVID-19, MRNA, LN-S, PF (MODERNA FULL 0.5 ML DOSE) 03/05/2021, 04/11/2021    COVID-19, MRNA, LN-S, PF (Pfizer) (Gray Cap) 09/23/2023    COVID-19, MRNA, LN-S, PF (Pfizer) (Purple Cap) 10/28/2021    COVID-19, mRNA, LNP-S, PF, andreina-sucrose, 30 mcg/0.3 mL (Pfizer Ages 12+) 09/23/2023    COVID-19, mRNA, LNP-S, bivalent booster, PF (PFIZER OMICRON) 09/16/2022    Influenza 12/29/2014, 01/04/2016, 09/15/2020    Influenza (FLUAD) - Quadrivalent - Adjuvanted - PF *Preferred* (65+) 09/01/2020, 11/06/2023    Influenza - Quadrivalent - High Dose - PF (65 years and older) 10/01/2022    Influenza - Quadrivalent - PF *Preferred* (6 months and older) 10/02/2017, 12/09/2018    Influenza - Trivalent - Fluad - Adjuvanted - PF (65 years and older 09/23/2019    Influenza - Trivalent - Fluzone High Dose - PF (65 years and older) 09/23/2021    Pneumococcal Conjugate - 13 Valent 08/15/2019    Pneumococcal Polysaccharide - 23 Valent 09/01/2020    Td (ADULT) 05/03/2018    Td - PF (ADULT) 05/03/2018    Tdap 02/05/2022    Zoster 04/20/2017       Review of patient's allergies indicates:  No Known Allergies    Current Outpatient Medications:     atorvastatin (LIPITOR) 20 MG tablet, Take 1 tablet (20 mg total) by mouth once daily. For cholesterol, Disp: 90 tablet, Rfl: 3    glucosamine-D3-Boswellia serr 1,500-400-100 mg-unit-mg Tab, Take by mouth., Disp: , Rfl:     losartan (COZAAR) 100 MG tablet, Take 1 tablet (100 mg total) by mouth once daily., Disp: 90 tablet, Rfl: 3    multivitamin-iron-folic acid (CENTRUM) Tab, , Disp: , Rfl:     turmeric 400 mg Cap, , Disp: , Rfl:     Review of Systems   Constitutional:  Negative for activity change, chills, fever and unexpected weight change.   HENT:  Negative for hearing loss, rhinorrhea and trouble swallowing.    Eyes:  Negative for discharge and visual disturbance.   Respiratory:  Negative for  "cough, chest tightness, shortness of breath and wheezing.    Cardiovascular:  Negative for chest pain, palpitations and leg swelling.   Gastrointestinal:  Negative for abdominal pain, blood in stool, constipation, diarrhea and vomiting.   Endocrine: Negative for polydipsia and polyuria.   Genitourinary:  Negative for difficulty urinating, dysuria, frequency, hematuria and urgency.   Musculoskeletal:  Negative for arthralgias, joint swelling and neck pain.   Skin:  Negative for rash.   Neurological:  Negative for dizziness, syncope, speech difficulty, weakness and headaches.   Psychiatric/Behavioral:  Negative for confusion and dysphoric mood. The patient is not nervous/anxious.           Objective:      Vitals:    02/10/25 0831   BP: 138/66   Pulse: 60   SpO2: 98%   Weight: 73.7 kg (162 lb 6.4 oz)   Height: 5' 7" (1.702 m)     Physical Exam  Vitals and nursing note reviewed.   Constitutional:       General: He is not in acute distress.     Appearance: Normal appearance. He is well-developed and normal weight.   HENT:      Head: Normocephalic and atraumatic.      Right Ear: Tympanic membrane and ear canal normal.      Left Ear: Tympanic membrane and ear canal normal.   Neck:      Vascular: No carotid bruit.   Cardiovascular:      Rate and Rhythm: Normal rate and regular rhythm.      Heart sounds: No murmur heard.     No friction rub. No gallop.   Pulmonary:      Effort: Pulmonary effort is normal. No respiratory distress.      Breath sounds: Normal breath sounds. No wheezing or rales.   Abdominal:      General: There is no distension.      Palpations: Abdomen is soft.      Tenderness: There is no abdominal tenderness.   Musculoskeletal:      Cervical back: Neck supple.      Right lower leg: No edema.      Left lower leg: No edema.   Lymphadenopathy:      Cervical: No cervical adenopathy.   Skin:     General: Skin is warm and dry.      Findings: No rash.   Neurological:      General: No focal deficit present.      " Mental Status: He is alert and oriented to person, place, and time.      Gait: Gait normal.   Psychiatric:         Mood and Affect: Mood normal.           Assessment:       1. Annual physical exam    2. Essential hypertension    3. Mixed hyperlipidemia    4. Pulmonary nodule    5. Elevated LFTs    6. Prostate cancer screening           Plan:       1. Annual physical exam  -reviewed labs with pt    2. Essential hypertension  -BP stable  -     losartan (COZAAR) 100 MG tablet; Take 1 tablet (100 mg total) by mouth once daily.  Dispense: 90 tablet; Refill: 3    3. Mixed hyperlipidemia  -well controlled on recent labs  -     atorvastatin (LIPITOR) 20 MG tablet; Take 1 tablet (20 mg total) by mouth once daily. For cholesterol  Dispense: 90 tablet; Refill: 3    4. Pulmonary nodule  -stable on last CT    5. Elevated LFTs  -reviewed with patient elevated alk-phos and slightly elevated ALT.  He denies alcohol or Tylenol use.  No abdominal pain, nausea, vomiting or weight loss.  Cholelithiasis and liver cysts seen on CT of the chest- will send for liver ultrasound.  Discussed importance of healthy low-fat diet  -     US Abdomen Limited; Future; Expected date: 02/10/2025    6. Prostate cancer screening  -     PSA, Screening; Future; Expected date: 02/10/2025      Follow up in about 1 year (around 2/10/2026) for annual visit.          Counseled on age and gender appropriate medical preventative services, including cancer screenings, immunizations, overall nutritional health, need for a consistent exercise regimen and an overall push towards maintaining a vigorous and active lifestyle.      2/10/2025 Pamela Medina NP

## 2025-02-11 ENCOUNTER — PATIENT MESSAGE (OUTPATIENT)
Dept: FAMILY MEDICINE | Facility: CLINIC | Age: 70
End: 2025-02-11
Payer: MEDICARE

## 2025-02-11 DIAGNOSIS — R97.20 ABNORMAL PSA: Primary | ICD-10-CM

## 2025-02-11 LAB — PSA SERPL-MCNC: 3.55 NG/ML

## 2025-02-12 ENCOUNTER — TELEPHONE (OUTPATIENT)
Dept: FAMILY MEDICINE | Facility: CLINIC | Age: 70
End: 2025-02-12
Payer: MEDICARE

## 2025-02-12 NOTE — TELEPHONE ENCOUNTER
Spoke with pt in regards to recent ultrasound of the liver. Verbalized verbatim per Pamela. Pt acknowledged understanding. Pt stated that he does not want to see a surgeon at this time, stated that he is going to wait until he gets to [place].     Pt is wanting to know if a supplement called Liver Aid would help with liver enzyme level.

## 2025-02-12 NOTE — TELEPHONE ENCOUNTER
Spoke with pt in regards to recent message. Verbalized verbatim per Pamela. Pt acknowledged understanding.

## 2025-02-12 NOTE — TELEPHONE ENCOUNTER
----- Message from Pamela Medina NP sent at 2/11/2025  5:12 PM CST -----  Please call patient and let him know ultrasound shows liver is normal size.  He does have 2 cysts in the liver which are benign appearing.  He is noted to have a very large gal stone in the gallbladder.  This stone is currently not causing any obstruction or inflammation of the gallbladder however if it did try to leave the gallbladder it would likely cause quite a bit of problems.  My thought is he could go visit a surgeon and see if the surgeon recommends having  the gallbladder removed now or is it something he can wait and have done in [place].  I do not think the gallstone is really playing a role in his mildly elevated liver enzyme on his labs.

## 2025-02-12 NOTE — TELEPHONE ENCOUNTER
Please let pt know I really don't know what is in liver aid- I would just recommend healthy diet- cut out high fat foods and processed sugars, avoid tylenol and alcohol

## 2025-02-18 ENCOUNTER — TELEPHONE (OUTPATIENT)
Dept: UROLOGY | Facility: CLINIC | Age: 70
End: 2025-02-18
Payer: MEDICARE

## 2025-02-18 NOTE — TELEPHONE ENCOUNTER
Pre appt   Scheduled for elevated psa noted in epic  Declines seeing any other urologist or pcp   Confirmed appt   Informed will need urine upon arrival

## 2025-02-24 ENCOUNTER — LAB VISIT (OUTPATIENT)
Dept: LAB | Facility: HOSPITAL | Age: 70
End: 2025-02-24
Attending: UROLOGY
Payer: MEDICARE

## 2025-02-24 ENCOUNTER — OFFICE VISIT (OUTPATIENT)
Dept: UROLOGY | Facility: CLINIC | Age: 70
End: 2025-02-24
Payer: MEDICARE

## 2025-02-24 VITALS
HEIGHT: 67 IN | HEART RATE: 65 BPM | BODY MASS INDEX: 25.27 KG/M2 | DIASTOLIC BLOOD PRESSURE: 88 MMHG | WEIGHT: 161 LBS | SYSTOLIC BLOOD PRESSURE: 150 MMHG

## 2025-02-24 DIAGNOSIS — R97.20 INCREASED PROSTATE SPECIFIC ANTIGEN (PSA) VELOCITY: ICD-10-CM

## 2025-02-24 DIAGNOSIS — R97.20 INCREASED PROSTATE SPECIFIC ANTIGEN (PSA) VELOCITY: Primary | ICD-10-CM

## 2025-02-24 DIAGNOSIS — R97.20 ABNORMAL PSA: ICD-10-CM

## 2025-02-24 LAB
BILIRUBIN, UA POC OHS: NEGATIVE
BLOOD, UA POC OHS: NEGATIVE
CLARITY, UA POC OHS: CLEAR
COLOR, UA POC OHS: YELLOW
CREAT SERPL-MCNC: 0.8 MG/DL (ref 0.5–1.4)
EST. GFR  (NO RACE VARIABLE): >60 ML/MIN/1.73 M^2
GLUCOSE, UA POC OHS: NEGATIVE
KETONES, UA POC OHS: NEGATIVE
LEUKOCYTES, UA POC OHS: NEGATIVE
NITRITE, UA POC OHS: NEGATIVE
PH, UA POC OHS: 7
POC RESIDUAL URINE VOLUME: 36 ML (ref 0–100)
PROTEIN, UA POC OHS: NEGATIVE
SPECIFIC GRAVITY, UA POC OHS: 1.01
UROBILINOGEN, UA POC OHS: 0.2

## 2025-02-24 PROCEDURE — 99999 PR PBB SHADOW E&M-EST. PATIENT-LVL III: CPT | Mod: PBBFAC,,, | Performed by: UROLOGY

## 2025-02-24 PROCEDURE — 99213 OFFICE O/P EST LOW 20 MIN: CPT | Mod: PBBFAC,PO | Performed by: UROLOGY

## 2025-02-24 PROCEDURE — 99999PBSHW POCT BLADDER SCAN: Mod: PBBFAC,,,

## 2025-02-24 PROCEDURE — 99205 OFFICE O/P NEW HI 60 MIN: CPT | Mod: S$PBB,,, | Performed by: UROLOGY

## 2025-02-24 PROCEDURE — 84153 ASSAY OF PSA TOTAL: CPT | Performed by: UROLOGY

## 2025-02-24 PROCEDURE — 82565 ASSAY OF CREATININE: CPT | Performed by: UROLOGY

## 2025-02-24 PROCEDURE — G2211 COMPLEX E/M VISIT ADD ON: HCPCS | Mod: S$PBB,,, | Performed by: UROLOGY

## 2025-02-24 PROCEDURE — 36415 COLL VENOUS BLD VENIPUNCTURE: CPT | Performed by: UROLOGY

## 2025-02-24 PROCEDURE — 51798 US URINE CAPACITY MEASURE: CPT | Mod: PBBFAC,PO | Performed by: UROLOGY

## 2025-02-24 PROCEDURE — 99999PBSHW POCT URINALYSIS(INSTRUMENT): Mod: PBBFAC,,,

## 2025-02-24 PROCEDURE — 81003 URINALYSIS AUTO W/O SCOPE: CPT | Mod: PBBFAC,PO | Performed by: UROLOGY

## 2025-02-24 NOTE — PROGRESS NOTES
[facility] New Patient/H&P:     Myron Iqbal is a 69 y.o. male who presents for evaluation of elevated psa at referral of DIVYA Medina    Last seen by DIVYA Medina in primary care on 2/10/25 noting  annual physical- follow-up hypertension/lipids. Pt reports overall he's been doing. Got  over the holidays and plans on moving to [place] soon. Is expecting their first grandchild any day now. Had f/u with Dr. Dobbs, pulmonary in November for pulmonary nodule which is stable on last CT. Still walking for exercise. Admits he enjoys his sweets- wt is up 4lbs since last visit  Labs 2/6/25 UA negative, Cr 0.73, eGFR 98  He was sent for psa after visit and 2/10/25 psa is 3.55  Noted to be in normal range on result note, but pt messaged pcp noting  Although PSA level is in normal range, the number increased .86 in each of the last 2 yrs. According to online reports anything over .75 in a year should give concern.   Referral placed for eval    On review, PSA history   Reference Range 08/03/20 11:04 02/02/22 08:13 02/03/23 08:58 02/05/24 08:44 02/10/25 09:20   PSA, SCR - QUEST < OR = 4.00 1.4 1.46 1.83 2.69 3.55     He presents today noting  Has morning frequency and overall stream has weakened over time  Reports AUA SS of 6/2 (2: Emptying; 1: Frequency, urgency, weak stream, sleeping)  Subjectively though has urgency and frequency throughout the morning even only after his 1 cup of coffee.  As well notes that if he looks at water will need to urinate  Fluid restricts on long car trips.   Paternal uncle had prostate cancer in his 50s, had prostatectomy  Maternal aunt had breast cancer    Past Medical History:   Diagnosis Date    Hypertension     Kidney stone     Liver cyst     Mixed hyperlipidemia        Past Surgical History:   Procedure Laterality Date    FRACTURE SURGERY  03/2013    [facility].  Ulna reduction surgery    HERNIA REPAIR      neck fusion      C5-C6    SPINE SURGERY  11/2012    Fusion of c5  &  "c6    ulnar reduction Right        Family History   Problem Relation Name Age of Onset    Cancer Father Bhupinder Iqbal Sr         Colon cancer    Hypertension Father Bhupinder Iqbal Sr     Cancer Mother Leatha Iqbal         Colon cancer    Diabetes Mother Leatha Iqbal     Hypertension Mother Leatha Iqbal     Cancer Brother Bhupinder Iqbal Jr         Stage 4 Melanoma    Diabetes Brother Ángel Iqbal          of heart attach because of diabetes    Heart disease Brother Ángel Iqbal     Diabetes Brother Tomi Iqbal        Social History[1]    Review of patient's allergies indicates:  No Known Allergies    Medications Reviewed: see MAR    Focused Physical Exam    Vitals:    25 1333   BP: (!) 150/88   Pulse: 65     Body mass index is 25.22 kg/m². Weight: 73 kg (161 lb) Height: 5' 7" (170.2 cm)       Abdomen: Soft, non-tender, nondistended, no CVA tenderness  :  circ normal phallus without plaques/lesions, orthotopic urethral meatus, bilaterally desc testes in normal scrotum, small R epididymal cyst NTP  LIZETH: normal sphincter tone, no masses, no hemmorrhoids   PROSTATE: 35-40g, no nodules, non-tender, symmetrical.       LABS:    Recent Results (from the past 2 weeks)   POCT Bladder Scan    Collection Time: 25  1:34 PM   Result Value Ref Range    POC Residual Urine Volume 36 0 - 100 mL   POCT Urinalysis(Instrument)    Collection Time: 25  1:43 PM   Result Value Ref Range    Color, POC UA Yellow Yellow, Straw, Colorless    Clarity, POC UA Clear Clear    Glucose, POC UA Negative Negative    Bilirubin, POC UA Negative Negative    Ketones, POC UA Negative Negative    Spec Grav POC UA 1.010 1.005 - 1.030    Blood, POC UA Negative Negative    pH, POC UA 7.0 5.0 - 8.0    Protein, POC UA Negative Negative    Urobilinogen, POC UA 0.2 <=1.0    Nitrite, POC UA Negative Negative    WBC, POC UA Negative Negative         Assessment/Diagnosis:    1. Increased prostate specific antigen (PSA) velocity  POCT " Bladder Scan    POCT Urinalysis(Instrument)    PSA, Total and Free    Creatinine, Serum      2. Abnormal PSA  Ambulatory referral/consult to Urology          Plans:    Extensive review of referral information, PSA history on file medical record etcetera.  We did discuss that his PSA is normal, and normal for his age as well as in normal range.  We did however review his PSA velocity increases over the last few years.  Reviewed that an elevated PSA is technically a PSA greater than 4 because statistically 20% of people greater than 4 have prostate cancer, so as an isolated statistic is not great at differentiating.  Concerning PSA velocity trends would be doubling in 1 year which he is also not experienced, though these consecutive rises over time can be further evaluated.  Certainly as he is towards the end of the routine screening age range at 69 years old, there are also age adjusted normal were a PSA between 4 and 10 can be acceptable for a man greater than 70, and he does have some prostate cancer and breast cancer in the family, though need neither of these are first-degree relatives, and his exam is normal and nonnodular today.    At minimum we discussed repeating PSA with free and total PSA.  The caveat being that free PSA is more accurate when the PSA is truly elevated between 4 and 10 however if his free PSA percentage is grossly normal 25% to 30% or greater, certainly less concern about underlying malignancy.  As well as if his PSA is stable.  If PSA is rising or free PSA is concerning could then get MRI of the prostate.  Offered both upfront but would like to proceed only if labs are abnormal which is quite reasonable, and therefore will go ahead and draw creatinine with his PSA such that if labs are abnormal has labs on file for MRI of the prostate should it be needed.    If labs are normal, minimal concern and likely related to BPH or prostate enlargement with aging, of which he does have a mildly  enlarged prostate on digital rectal exam.  That being said, could continue PSA monitoring and screening based on lab trends and family history moving forward.  Would check both data points with PSA free and total    In regards to his enlarged prostate, likely underlying BPH/LUTS, and urinary symptoms, overall he reports a mild symptom score, but subjectively has a bit more urgency frequency and difficulty voiding.  We discuss the long-term consequences of prostate obstruction even in the absence of obstructive symptoms can be stress on the bladder and changes of obstruction at the level of the bladder which can increase urgency frequency and irritative symptoms which he is experiencing.  We did review all conservative recommendations for urgency and frequency and had risk benefit discussion about a trial of medical therapy for alpha-blocker to relieve prostate obstruction in combination with the urgency frequency recommendations to see if this helps his symptom profile.  At this time, as he is adjusting to and around some of his urinary habits, reviewed and provided all conservative recommendations for urgency and frequency in writing, and he may consider alpha-blocker or  medications in the future.    Will get PSA free and total and creatinine today and chart check the results.  If abnormal, can get MRI of the prostate in the coming weeks, otherwise encouraged him to establish urologic care when he moves to [place].    Total time spent in/on encounter today, including face to face time with patient, counseling, medical record review, interpretation of tests/results, , and treatment plan coordination: 60 minutes    *Visit today included increased complexity associated with the current or anticipated ongoing medical longitudinal care and management related to this patient's serious and/or complex managed problem(s) as described above.         [1]   Social History  Socioeconomic History    Marital  status: Single   Tobacco Use    Smoking status: Never     Passive exposure: Never    Smokeless tobacco: Never   Substance and Sexual Activity    Alcohol use: Yes     Alcohol/week: 2.0 standard drinks of alcohol     Types: 2 Glasses of wine per week     Comment: socially    Drug use: Never    Sexual activity: Yes     Partners: Male     Birth control/protection: None     Social Drivers of Health     Financial Resource Strain: Low Risk  (2/17/2025)    Overall Financial Resource Strain (CARDIA)     Difficulty of Paying Living Expenses: Not hard at all   Food Insecurity: No Food Insecurity (2/17/2025)    Hunger Vital Sign     Worried About Running Out of Food in the Last Year: Never true     Ran Out of Food in the Last Year: Never true   Transportation Needs: No Transportation Needs (2/17/2025)    PRAPARE - Transportation     Lack of Transportation (Medical): No     Lack of Transportation (Non-Medical): No   Physical Activity: Sufficiently Active (2/17/2025)    Exercise Vital Sign     Days of Exercise per Week: 3 days     Minutes of Exercise per Session: 60 min   Stress: No Stress Concern Present (2/17/2025)    [employer] [place] [employer] - Occupational Stress Questionnaire     Feeling of Stress : Only a little   Housing Stability: Low Risk  (2/17/2025)    Housing Stability Vital Sign     Unable to Pay for Housing in the Last Year: No     Homeless in the Last Year: No

## 2025-02-25 LAB
PROSTATE SPECIFIC ANTIGEN, TOTAL: 4.2 NG/ML (ref 0–4)
PSA FREE MFR SERPL: 11.19 %
PSA FREE SERPL-MCNC: 0.47 NG/ML (ref 0–1.5)

## 2025-02-26 ENCOUNTER — PATIENT MESSAGE (OUTPATIENT)
Dept: UROLOGY | Facility: CLINIC | Age: 70
End: 2025-02-26
Payer: MEDICARE

## 2025-02-26 DIAGNOSIS — R97.20 ELEVATED PSA: Primary | ICD-10-CM

## 2025-02-27 NOTE — TELEPHONE ENCOUNTER
See notes with patient  Psa elevated  Mri ordered     Please set next avail    Awaiting pt reply on if local for biopsy before moving, or will est with urology for it in [place]

## 2025-03-01 ENCOUNTER — HOSPITAL ENCOUNTER (OUTPATIENT)
Dept: RADIOLOGY | Facility: HOSPITAL | Age: 70
Discharge: HOME | End: 2025-03-01
Attending: UROLOGY
Payer: MEDICARE

## 2025-03-01 DIAGNOSIS — R97.20 ELEVATED PSA: ICD-10-CM

## 2025-03-01 PROCEDURE — 72197 MRI PELVIS W/O & W/DYE: CPT | Mod: TC

## 2025-03-01 PROCEDURE — 25500020 PHARM REV CODE 255

## 2025-03-01 PROCEDURE — A9585 GADOBUTROL INJECTION: HCPCS

## 2025-03-01 PROCEDURE — 72197 MRI PELVIS W/O & W/DYE: CPT | Mod: 26,,, | Performed by: RADIOLOGY

## 2025-03-01 RX ORDER — GADOBUTROL 604.72 MG/ML
INJECTION INTRAVENOUS
Status: COMPLETED
Start: 2025-03-01 | End: 2025-03-01

## 2025-03-01 RX ADMIN — GADOBUTROL 7 ML: 604.72 INJECTION INTRAVENOUS at 01:03

## 2025-03-06 ENCOUNTER — TELEPHONE (OUTPATIENT)
Dept: UROLOGY | Facility: CLINIC | Age: 70
End: 2025-03-06
Payer: MEDICARE

## 2025-03-06 NOTE — TELEPHONE ENCOUNTER
----- Message from Arianne sent at 3/6/2025 11:11 AM CST -----  Contact: PT  Type:  Patient Requesting ReferralWho Called:  PTDoes the patient already have the specialty appointment scheduled?:  n/aIf yes, what is the date of that appointment?: n/aReferral to What Specialty:  UrologyReason for Referral:  Biopsy of ProstateDoes the patient want the referral with a specific physician?:  yesIs the specialist an [facility] or [facility]?:  Non-[facility]Patient Requesting a Call Back?:   yesBest Call Back Number:  [phone] Information:   PT would  like referral to sent to Dr. Valdez Larios

## 2025-03-06 NOTE — TELEPHONE ENCOUNTER
Returned pts call   He called to inquire about   Records needing to be faxed to [place]  Pt is asking for a referral  As the only way he can get an appt   Fax number listed in body of message taken

## 2025-03-07 NOTE — TELEPHONE ENCOUNTER
Request will be faxed on 3/10   Dapsone Counseling: I discussed with the patient the risks of dapsone including but not limited to hemolytic anemia, agranulocytosis, rashes, methemoglobinemia, kidney failure, peripheral neuropathy, headaches, GI upset, and liver toxicity.  Patients who start dapsone require monitoring including baseline LFTs and weekly CBCs for the first month, then every month thereafter.  The patient verbalized understanding of the proper use and possible adverse effects of dapsone.  All of the patient's questions and concerns were addressed.

## 2025-03-07 NOTE — TELEPHONE ENCOUNTER
See prior mychart encounters    Ok to send records to Dr Larios per pt request -- o/v note, psa, mri report, and mychart encounter message detailed encounter    Note on cover - pt moving to area with elevated psa and positive mri, advised pt to get disc with dicom images of mri to bring, needs asap eval and prostate biopsy. thanks

## 2025-03-17 ENCOUNTER — TELEPHONE (OUTPATIENT)
Dept: UROLOGY | Facility: CLINIC | Age: 70
End: 2025-03-17
Payer: MEDICARE

## 2025-03-17 NOTE — TELEPHONE ENCOUNTER
----- Message from Elizabeth sent at 3/17/2025  3:50 PM CDT -----  Contact: Aylin [phone]  Type:  Needs Medical AdviceWho Called: [facility] Dr. Omar Larios Would the patient rather a call back or a response via [facility]? Call back Best Call Back Number: [phone] Office number Additional Information: they need demographic information faxed over for pt fax number : [phone] ATTN:[email or web address]

## 2025-06-27 ENCOUNTER — PATIENT MESSAGE (OUTPATIENT)
Dept: FAMILY MEDICINE | Facility: CLINIC | Age: 70
End: 2025-06-27
Payer: MEDICARE

## 2025-08-25 DIAGNOSIS — Z00.00 ENCOUNTER FOR MEDICARE ANNUAL WELLNESS EXAM: ICD-10-CM
